# Patient Record
Sex: FEMALE | Race: BLACK OR AFRICAN AMERICAN | NOT HISPANIC OR LATINO | Employment: FULL TIME | URBAN - METROPOLITAN AREA
[De-identification: names, ages, dates, MRNs, and addresses within clinical notes are randomized per-mention and may not be internally consistent; named-entity substitution may affect disease eponyms.]

---

## 2017-08-23 ENCOUNTER — HISTORICAL (OUTPATIENT)
Dept: ADMINISTRATIVE | Facility: HOSPITAL | Age: 22
End: 2017-08-23

## 2017-08-23 LAB
ABS NEUT (OLG): 3.55 X10(3)/MCL (ref 2.1–9.2)
BASOPHILS # BLD AUTO: 0.03 X10(3)/MCL
BASOPHILS NFR BLD AUTO: 0 % (ref 0–1)
BILIRUB SERPL-MCNC: NEGATIVE MG/DL
BLOOD URINE, POC: NEGATIVE
BUN SERPL-MCNC: 7 MG/DL (ref 7–18)
CALCIUM SERPL-MCNC: 9.1 MG/DL (ref 8.5–10.1)
CHLORIDE SERPL-SCNC: 103 MMOL/L (ref 98–107)
CLARITY, POC UA: CLEAR
CO2 SERPL-SCNC: 27 MMOL/L (ref 21–32)
COLOR, POC UA: YELLOW
CREAT SERPL-MCNC: 0.6 MG/DL (ref 0.6–1.3)
EOSINOPHIL # BLD AUTO: 0.08 X10(3)/MCL
EOSINOPHIL NFR BLD AUTO: 1 % (ref 0–5)
ERYTHROCYTE [DISTWIDTH] IN BLOOD BY AUTOMATED COUNT: 15 % (ref 11.5–14.5)
GLUCOSE SERPL-MCNC: 83 MG/DL (ref 74–106)
GLUCOSE UR QL STRIP: NEGATIVE
HBV SURFACE AG SERPL QL IA: NEGATIVE
HCT VFR BLD AUTO: 35 % (ref 35–46)
HCV AB SERPL QL IA: NONREACTIVE
HGB BLD-MCNC: 11.7 GM/DL (ref 12–16)
HIV 1+2 AB+HIV1 P24 AG SERPL QL IA: NONREACTIVE
IMM GRANULOCYTES # BLD AUTO: 0.01 10*3/UL
IMM GRANULOCYTES NFR BLD AUTO: 0 %
KETONES UR QL STRIP: NEGATIVE
LEUKOCYTE EST, POC UA: NEGATIVE
LYMPHOCYTES # BLD AUTO: 1.8 X10(3)/MCL
LYMPHOCYTES NFR BLD AUTO: 31 % (ref 15–40)
MCH RBC QN AUTO: 27.2 PG (ref 26–34)
MCHC RBC AUTO-ENTMCNC: 33.4 GM/DL (ref 31–37)
MCV RBC AUTO: 81.4 FL (ref 80–100)
MONOCYTES # BLD AUTO: 0.42 X10(3)/MCL
MONOCYTES NFR BLD AUTO: 7 % (ref 4–12)
NEUTROPHILS # BLD AUTO: 3.55 X10(3)/MCL
NEUTROPHILS NFR BLD AUTO: 60 X10(3)/MCL
NITRITE, POC UA: NEGATIVE
PH, POC UA: 6.5
PLATELET # BLD AUTO: 360 X10(3)/MCL (ref 130–400)
PMV BLD AUTO: 9.8 FL (ref 7.4–10.4)
POTASSIUM SERPL-SCNC: 3.6 MMOL/L (ref 3.5–5.1)
PROTEIN, POC: NEGATIVE
RBC # BLD AUTO: 4.3 X10(6)/MCL (ref 4–5.2)
SODIUM SERPL-SCNC: 137 MMOL/L (ref 136–145)
SPECIFIC GRAVITY, POC UA: 1.01
T PALLIDUM AB SER QL: NONREACTIVE
UROBILINOGEN, POC UA: NORMAL
WBC # SPEC AUTO: 5.9 X10(3)/MCL (ref 4.5–11)

## 2017-08-25 LAB — FINAL CULTURE: NO GROWTH

## 2017-09-18 LAB
BILIRUB SERPL-MCNC: NEGATIVE MG/DL
BLOOD URINE, POC: NORMAL
CLARITY, POC UA: CLEAR
COLOR, POC UA: YELLOW
GLUCOSE UR QL STRIP: NEGATIVE
KETONES UR QL STRIP: NEGATIVE
LEUKOCYTE EST, POC UA: NEGATIVE
NITRITE, POC UA: NEGATIVE
PH, POC UA: 7.5
PROTEIN, POC: NEGATIVE
SPECIFIC GRAVITY, POC UA: 1
UROBILINOGEN, POC UA: NORMAL

## 2017-10-23 LAB
BILIRUB SERPL-MCNC: NEGATIVE MG/DL
BLOOD URINE, POC: NEGATIVE
CLARITY, POC UA: CLEAR
COLOR, POC UA: YELLOW
GLUCOSE UR QL STRIP: NEGATIVE
KETONES UR QL STRIP: NEGATIVE
LEUKOCYTE EST, POC UA: NEGATIVE
NITRITE, POC UA: NEGATIVE
PH, POC UA: 7
PROTEIN, POC: NEGATIVE
SPECIFIC GRAVITY, POC UA: 1
UROBILINOGEN, POC UA: NORMAL

## 2017-12-01 LAB
BILIRUB SERPL-MCNC: NORMAL MG/DL
BLOOD URINE, POC: NEGATIVE
CLARITY, POC UA: CLEAR
COLOR, POC UA: YELLOW
GLUCOSE UR QL STRIP: NEGATIVE
KETONES UR QL STRIP: NEGATIVE
LEUKOCYTE EST, POC UA: NEGATIVE
NITRITE, POC UA: NEGATIVE
PH, POC UA: 6.5
PROTEIN, POC: NORMAL
SPECIFIC GRAVITY, POC UA: 1.01
UROBILINOGEN, POC UA: NORMAL

## 2017-12-10 ENCOUNTER — HOSPITAL ENCOUNTER (OUTPATIENT)
Dept: OBSTETRICS AND GYNECOLOGY | Facility: HOSPITAL | Age: 22
End: 2017-12-10
Attending: OBSTETRICS & GYNECOLOGY | Admitting: OBSTETRICS & GYNECOLOGY

## 2017-12-10 LAB
APPEARANCE, UA: CLEAR
BACTERIA SPEC CULT: NORMAL /HPF
BILIRUB UR QL STRIP: NEGATIVE
COLOR UR: YELLOW
GLUCOSE (UA): NEGATIVE
HGB UR QL STRIP: NEGATIVE
KETONES UR QL STRIP: NEGATIVE
LEUKOCYTE ESTERASE UR QL STRIP: NEGATIVE
NITRITE UR QL STRIP: NEGATIVE
PH UR STRIP: 7.5 [PH] (ref 5–9)
PROT UR QL STRIP: NEGATIVE
RBC #/AREA URNS HPF: NORMAL /[HPF]
SP GR UR STRIP: 1.02 (ref 1–1.03)
SQUAMOUS EPITHELIAL, UA: NORMAL
UROBILINOGEN UR STRIP-ACNC: 1
WBC #/AREA URNS HPF: NORMAL /[HPF]

## 2018-01-09 ENCOUNTER — HISTORICAL (OUTPATIENT)
Dept: ADMINISTRATIVE | Facility: HOSPITAL | Age: 23
End: 2018-01-09

## 2018-01-09 ENCOUNTER — HISTORICAL (OUTPATIENT)
Dept: FAMILY MEDICINE | Facility: CLINIC | Age: 23
End: 2018-01-09

## 2018-01-09 LAB
BILIRUB SERPL-MCNC: NEGATIVE MG/DL
BLOOD URINE, POC: NEGATIVE
CLARITY, POC UA: CLEAR
COLOR, POC UA: NORMAL
GLUCOSE 1H P 100 G GLC PO SERPL-MCNC: 114 MG/DL
GLUCOSE UR QL STRIP: NEGATIVE
KETONES UR QL STRIP: NEGATIVE
LEUKOCYTE EST, POC UA: NEGATIVE
NITRITE, POC UA: NEGATIVE
PH, POC UA: 6.5
PROTEIN, POC: NORMAL
SPECIFIC GRAVITY, POC UA: 1.02
T PALLIDUM AB SER QL: NONREACTIVE
UROBILINOGEN, POC UA: NORMAL

## 2018-02-06 LAB
BILIRUB SERPL-MCNC: NEGATIVE MG/DL
BLOOD URINE, POC: NEGATIVE
CLARITY, POC UA: NORMAL
COLOR, POC UA: YELLOW
GLUCOSE UR QL STRIP: NEGATIVE
KETONES UR QL STRIP: NEGATIVE
LEUKOCYTE EST, POC UA: NEGATIVE
NITRITE, POC UA: NEGATIVE
PH, POC UA: 7
PROTEIN, POC: NORMAL
SPECIFIC GRAVITY, POC UA: 1
UROBILINOGEN, POC UA: NORMAL

## 2018-02-20 LAB
BILIRUB SERPL-MCNC: NEGATIVE MG/DL
BLOOD URINE, POC: NEGATIVE
CLARITY, POC UA: CLEAR
COLOR, POC UA: YELLOW
GLUCOSE UR QL STRIP: NEGATIVE
KETONES UR QL STRIP: NEGATIVE
LEUKOCYTE EST, POC UA: NEGATIVE
NITRITE, POC UA: NEGATIVE
PH, POC UA: 7
PROTEIN, POC: NEGATIVE
SPECIFIC GRAVITY, POC UA: 1.01
UROBILINOGEN, POC UA: NORMAL

## 2018-03-08 ENCOUNTER — HISTORICAL (OUTPATIENT)
Dept: ADMINISTRATIVE | Facility: HOSPITAL | Age: 23
End: 2018-03-08

## 2018-03-08 LAB
ABS NEUT (OLG): 6.99 X10(3)/MCL (ref 2.1–9.2)
BASOPHILS # BLD AUTO: 0.03 X10(3)/MCL
BASOPHILS NFR BLD AUTO: 0 %
BILIRUB SERPL-MCNC: NEGATIVE MG/DL
BLOOD URINE, POC: NEGATIVE
CLARITY, POC UA: CLEAR
COLOR, POC UA: YELLOW
EOSINOPHIL # BLD AUTO: 0.19 X10(3)/MCL
EOSINOPHIL NFR BLD AUTO: 2 %
ERYTHROCYTE [DISTWIDTH] IN BLOOD BY AUTOMATED COUNT: 14.9 % (ref 11.5–14.5)
GLUCOSE UR QL STRIP: NEGATIVE
HCT VFR BLD AUTO: 28.7 % (ref 35–46)
HGB BLD-MCNC: 9.3 GM/DL (ref 12–16)
HIV 1+2 AB+HIV1 P24 AG SERPL QL IA: NONREACTIVE
IMM GRANULOCYTES # BLD AUTO: 0.12 10*3/UL
IMM GRANULOCYTES NFR BLD AUTO: 1 %
KETONES UR QL STRIP: NEGATIVE
LEUKOCYTE EST, POC UA: NORMAL
LYMPHOCYTES # BLD AUTO: 2.06 X10(3)/MCL
LYMPHOCYTES NFR BLD AUTO: 20 % (ref 13–40)
MCH RBC QN AUTO: 27 PG (ref 26–34)
MCHC RBC AUTO-ENTMCNC: 32.4 GM/DL (ref 31–37)
MCV RBC AUTO: 83.4 FL (ref 80–100)
MONOCYTES # BLD AUTO: 0.88 X10(3)/MCL
MONOCYTES NFR BLD AUTO: 9 % (ref 4–12)
NEUTROPHILS # BLD AUTO: 6.99 X10(3)/MCL
NEUTROPHILS NFR BLD AUTO: 68 X10(3)/MCL
NITRITE, POC UA: NEGATIVE
PH, POC UA: 7
PLATELET # BLD AUTO: 277 X10(3)/MCL (ref 130–400)
PMV BLD AUTO: 9.5 FL (ref 7.4–10.4)
PROTEIN, POC: NORMAL
RBC # BLD AUTO: 3.44 X10(6)/MCL (ref 4–5.2)
SPECIFIC GRAVITY, POC UA: 1.01
UROBILINOGEN, POC UA: NORMAL
WBC # SPEC AUTO: 10.3 X10(3)/MCL (ref 4.5–11)

## 2018-03-11 LAB — FINAL CULTURE: NORMAL

## 2018-03-14 LAB
BILIRUB SERPL-MCNC: NEGATIVE MG/DL
BLOOD URINE, POC: NEGATIVE
CLARITY, POC UA: CLEAR
COLOR, POC UA: YELLOW
GLUCOSE UR QL STRIP: NEGATIVE
KETONES UR QL STRIP: NEGATIVE
LEUKOCYTE EST, POC UA: NORMAL
NITRITE, POC UA: NEGATIVE
PH, POC UA: 7.5
PROTEIN, POC: NORMAL
SPECIFIC GRAVITY, POC UA: 1.01
UROBILINOGEN, POC UA: NORMAL

## 2018-03-20 LAB
BILIRUB SERPL-MCNC: NEGATIVE MG/DL
BLOOD URINE, POC: NEGATIVE
CLARITY, POC UA: CLEAR
COLOR, POC UA: YELLOW
GLUCOSE UR QL STRIP: NEGATIVE
KETONES UR QL STRIP: NEGATIVE
LEUKOCYTE EST, POC UA: NEGATIVE
NITRITE, POC UA: NEGATIVE
PH, POC UA: 7.5
PROTEIN, POC: NORMAL
SPECIFIC GRAVITY, POC UA: 1.01
UROBILINOGEN, POC UA: NORMAL

## 2018-03-26 LAB
BILIRUB SERPL-MCNC: NEGATIVE MG/DL
BLOOD URINE, POC: NEGATIVE
CLARITY, POC UA: NORMAL
COLOR, POC UA: YELLOW
GLUCOSE UR QL STRIP: NEGATIVE
KETONES UR QL STRIP: NEGATIVE
LEUKOCYTE EST, POC UA: NEGATIVE
NITRITE, POC UA: NEGATIVE
PH, POC UA: 6.5
PROTEIN, POC: NORMAL
SPECIFIC GRAVITY, POC UA: 1.01
UROBILINOGEN, POC UA: NORMAL

## 2018-07-30 ENCOUNTER — HISTORICAL (OUTPATIENT)
Dept: RADIOLOGY | Facility: HOSPITAL | Age: 23
End: 2018-07-30

## 2018-07-30 LAB
BUN SERPL-MCNC: 8 MG/DL (ref 7–18)
CREAT SERPL-MCNC: 0.6 MG/DL (ref 0.6–1.3)

## 2020-08-04 LAB — POC BETA-HCG (QUAL): NEGATIVE

## 2020-10-27 ENCOUNTER — HISTORICAL (OUTPATIENT)
Dept: ADMINISTRATIVE | Facility: HOSPITAL | Age: 25
End: 2020-10-27

## 2020-10-27 LAB
ABS NEUT (OLG): 2.93 X10(3)/MCL (ref 2.1–9.2)
BASOPHILS # BLD AUTO: 0 X10(3)/MCL (ref 0–0.2)
BASOPHILS NFR BLD AUTO: 0 %
BUN SERPL-MCNC: 10 MG/DL (ref 7–18.7)
CALCIUM SERPL-MCNC: 9.5 MG/DL (ref 8.4–10.2)
CHLORIDE SERPL-SCNC: 102 MMOL/L (ref 98–107)
CO2 SERPL-SCNC: 28 MMOL/L (ref 22–29)
CREAT SERPL-MCNC: 0.72 MG/DL (ref 0.55–1.02)
CREAT/UREA NIT SERPL: 14
EOSINOPHIL # BLD AUTO: 0.1 X10(3)/MCL (ref 0–0.9)
EOSINOPHIL NFR BLD AUTO: 2 %
ERYTHROCYTE [DISTWIDTH] IN BLOOD BY AUTOMATED COUNT: 12.8 % (ref 11.5–14.5)
GLUCOSE SERPL-MCNC: 53 MG/DL (ref 74–100)
HCT VFR BLD AUTO: 43.2 % (ref 35–46)
HGB BLD-MCNC: 14.1 GM/DL (ref 12–16)
IMM GRANULOCYTES # BLD AUTO: 0.01 10*3/UL
IMM GRANULOCYTES NFR BLD AUTO: 0 %
LYMPHOCYTES # BLD AUTO: 2.2 X10(3)/MCL (ref 0.6–4.6)
LYMPHOCYTES NFR BLD AUTO: 38 %
MCH RBC QN AUTO: 30.1 PG (ref 26–34)
MCHC RBC AUTO-ENTMCNC: 32.6 GM/DL (ref 31–37)
MCV RBC AUTO: 92.1 FL (ref 80–100)
MONOCYTES # BLD AUTO: 0.4 X10(3)/MCL (ref 0.1–1.3)
MONOCYTES NFR BLD AUTO: 8 %
NEUTROPHILS # BLD AUTO: 2.93 X10(3)/MCL (ref 2.1–9.2)
NEUTROPHILS NFR BLD AUTO: 52 %
PLATELET # BLD AUTO: 308 X10(3)/MCL (ref 130–400)
PMV BLD AUTO: 9.6 FL (ref 7.4–10.4)
POTASSIUM SERPL-SCNC: 3.5 MMOL/L (ref 3.5–5.1)
RBC # BLD AUTO: 4.69 X10(6)/MCL (ref 4–5.2)
SODIUM SERPL-SCNC: 136 MMOL/L (ref 136–145)
WBC # SPEC AUTO: 5.7 X10(3)/MCL (ref 4.5–11)

## 2021-04-30 ENCOUNTER — HISTORICAL (OUTPATIENT)
Dept: ADMINISTRATIVE | Facility: HOSPITAL | Age: 26
End: 2021-04-30

## 2021-04-30 LAB
CHOLEST SERPL-MCNC: 155 MG/DL
CHOLEST/HDLC SERPL: 3 {RATIO} (ref 0–5)
HDLC SERPL-MCNC: 55 MG/DL (ref 35–60)
LDLC SERPL CALC-MCNC: 85 MG/DL (ref 50–140)
TRIGL SERPL-MCNC: 77 MG/DL (ref 37–140)
VLDLC SERPL CALC-MCNC: 15 MG/DL

## 2021-09-21 LAB
PAP RECOMMENDATION EXT: NORMAL
PAP SMEAR: NORMAL

## 2022-04-10 ENCOUNTER — HISTORICAL (OUTPATIENT)
Dept: ADMINISTRATIVE | Facility: HOSPITAL | Age: 27
End: 2022-04-10
Payer: MEDICAID

## 2022-04-26 VITALS
BODY MASS INDEX: 29.55 KG/M2 | WEIGHT: 183.88 LBS | DIASTOLIC BLOOD PRESSURE: 87 MMHG | HEIGHT: 66 IN | OXYGEN SATURATION: 98 % | SYSTOLIC BLOOD PRESSURE: 125 MMHG

## 2022-04-29 NOTE — PROGRESS NOTES
Health Maintenance     Pending (in the next year)        OverDue           Cervical Cancer Screening due  04/19/17  and every 1  year(s)        Due            Alcohol Misuse Screening due  08/23/17  and every 1  year(s)        Due In Future            Influenza Vaccine not due until  10/02/17  and every 1  year(s)           Depression Screening not due until  07/21/18  and every 1  year(s)     Satisfied (in the past 1 year)        Satisfied            Blood Pressure Screening on  08/23/17.  Satisfied by Sindy Richardson LPN           Body Mass Index Check on  08/23/17.  Satisfied by Sindy Richardson LPN           Depression Screening on  07/21/17.  Satisfied by Zulma Ruth LPN           HIV Screening on  08/30/17.  Satisfied by Mariama Elias MD           Influenza Vaccine on  12/09/16.  Satisfied by Jeanie Perry LPN           Obesity Screening on  08/23/17.  Satisfied by Sindy iRchardson LPN

## 2022-04-29 NOTE — PROGRESS NOTES
Patient:   Heide Rodrigues            MRN: [id number]            FIN: [id number]               Age:   22 years     Sex:  Female     :  1995   Associated Diagnoses:   None   Author:   Mariama Elias MD      Chief Complaint   2017 7:51 CDT       initial ob, no complaints.      History of Present Illness   23 yo  8w 2d /7 WGA with EDC of 18 by 1st trimester ultrasound presents to [facility] Initial OB clinic.    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,                           Asthma uses albuterol,                             GERD takes Nexium      OB Review of Systems:  Fetal movements: denies  Vaginal bleeding: some spotting a week ago  Vaginal discharge: denies  Loss of fluid: denies  Contractions: denies  Headaches: intermittent  Vision changes: denies  Edema: denies    Gestational History:   - G2: current pregnancy      - G1: , c section due to failure to progress, 41 weeks, 7pounds, female, no complications   GYN History: LMP: 17, Age at menarche: 14yo, Menstrual hx: periods regular 28 day interval, previously on (birth control), denies h/o STDs, denies h/o abnormal pap smears  Past Medical History:Denied  Surgical History: C section, tonsel  Social History: Denies tobacco, alcohol, previous marijuana use  Medications: PNV, albuterol, (Elavil, Gabapentin, Sertraline)  Family History: Denied           Physical Examination   Vital Signs   2017 7:51 CDT       Temperature Oral          36.9 DegC                             Peripheral Pulse Rate     73 bpm                             Respiratory Rate          20 br/min                             SpO2                      100 %                             Saturation Probe Site     Hand, left                             Systolic Blood Pressure   124 mmHg                             Diastolic Blood Pressure  83 mmHg                             Mean Arterial Pressure,  Cuff              97 mmHg                             Blood Pressure Location   Right arm                             Blood Pressure Cuff Size  Adult large     General:  No acute distress.    Respiratory:  Lungs are clear to auscultation, Respirations are non-labored, Breath sounds are equal, Symmetrical chest wall expansion.    Cardiovascular:  Normal rate, Regular rhythm, Good pulses equal in all extremities, No edema, No murmurs appreciated.    Gastrointestinal:  Soft, Non-tender, Normal bowel sounds, gravid.    Obstetric Exam     Uterus: consistent with gestational age.     Perineum: intact, no lesions.     Vulva: no lesions.     Vagina: no discharge, no lesions, no mass.     Cervix (closed, no masses or lesions noted on exam).     Integumentary:  Warm, Dry.    Neurologic:  Alert, Oriented, no focal motor or neurological deficits appreciated.    Cognition and Speech:  Oriented, Speech clear and coherent, Functional cognition intact.    Psychiatric:  Cooperative, Appropriate mood & affect, Normal judgment.       Review / Management     Ultrasound: 17 at 8w4d   Comments: Early 1st trimester intrauterine pregnancy measuring 8w4d for EDC of 2018 consistent with menstrual date of 2018. FHR: 167. Viable intrauterine pregnancy.  ASSIGN EDC: 2018    Inital OB Labs:  Blood Type and Rh: _  Antibody Screen: _  CBC H/H: _  HIV: _  RPR: _  GC: _  CT: _  HBsAg: _  HCVAb: _  Rubella: _  Varicella: _  UA & Culture: _  Sickle Cell Screen: _  PAP: _  Influenza vaccine date: _    15-20 Weeks Lab  Quad Screen: _    28 Week Lab  1H GTT: _  Date of Rhogam if indicated: _  Date of Tdap: _  CBC H/H: _  RPR: _    36 Week Lab  CBC H/H: _  RPR: _  GBS Culture: _  HIV: _  Urine: GC: _  Urine: CT: _       Impression and Plan   Diagnosis     AP: 23 yo  8w 2d /7 WGA  1. IUP   - Start PNVs, prescription sent to pharmacy   - Initial OB labs ordered   - Plans on both bottle and breast feeding   - Desires patch for  postpartum contraception   - Labor precautions given    2. Headaches      - Well controlled      - Does not need any medication for relief    3 GERD      - Takes Nexium      - Encouraged patient to switch to Pepcid complete     4. Asthma       - Well controlled       - Does not need albuterol inhaler    4. Lipoma on Skull plate      - Stable      - Followed by Neurology      - Annual MRIs    Return to clinic 4 weeks, assign to FM resident

## 2022-04-29 NOTE — PROGRESS NOTES
Patient:   Heide Rodrigues            MRN: [id number]            FIN: [id number]               Age:   22 years     Sex:  Female     :  1995   Associated Diagnoses:   None   Author:   Buzz Curtis MD      Physician: BEATRIZ  Reason for Exam: INITIAL PRENATAL VISIT; DATING ULTRASOUND  : 2  Parity: 1  LMP: 2017  Gestational Age: 8w2d  EDC: 2018    Examination:  EVERTON: ADEQUATE  Fetal Tone: PRESENT  Fetal Movement:   Fetal Heart Rate: 167  Fetus: SINLGE  Presentation:   Placenta:       Position: ANTERIOR      Grade:     Measurement:  CRL: 2.03cm  EGA: 8w4d  EDC: 2018    Comments: Early 1st trimester intrauterine pregnancy measuring 8w4d for EDC of 2018 consistent with menstrual date of 2018. FHR: 167. Viable intrauterine pregnancy.  ASSIGN EDC: 2018          This document has images

## 2022-05-17 ENCOUNTER — CLINICAL SUPPORT (OUTPATIENT)
Dept: GYNECOLOGY | Facility: CLINIC | Age: 27
End: 2022-05-17
Payer: MEDICAID

## 2022-05-17 DIAGNOSIS — Z30.9 ENCOUNTER FOR CONTRACEPTIVE MANAGEMENT, UNSPECIFIED TYPE: Primary | ICD-10-CM

## 2022-05-17 PROCEDURE — 99212 OFFICE O/P EST SF 10 MIN: CPT | Mod: PBBFAC

## 2022-05-17 PROCEDURE — 96372 THER/PROPH/DIAG INJ SC/IM: CPT | Mod: PBBFAC

## 2022-05-17 RX ORDER — MEDROXYPROGESTERONE ACETATE 150 MG/ML
150 INJECTION, SUSPENSION INTRAMUSCULAR
Status: COMPLETED | OUTPATIENT
Start: 2022-05-17 | End: 2022-05-17

## 2022-05-17 RX ADMIN — MEDROXYPROGESTERONE ACETATE 150 MG: 150 INJECTION, SUSPENSION INTRAMUSCULAR at 12:05

## 2022-06-16 ENCOUNTER — OFFICE VISIT (OUTPATIENT)
Dept: FAMILY MEDICINE | Facility: CLINIC | Age: 27
End: 2022-06-16
Payer: MEDICAID

## 2022-06-16 VITALS
DIASTOLIC BLOOD PRESSURE: 79 MMHG | BODY MASS INDEX: 31.72 KG/M2 | WEIGHT: 197.38 LBS | TEMPERATURE: 98 F | RESPIRATION RATE: 18 BRPM | SYSTOLIC BLOOD PRESSURE: 125 MMHG | HEART RATE: 102 BPM

## 2022-06-16 DIAGNOSIS — Z30.9 ENCOUNTER FOR CONTRACEPTIVE MANAGEMENT, UNSPECIFIED TYPE: Primary | ICD-10-CM

## 2022-06-16 DIAGNOSIS — R10.9 ABDOMINAL CRAMPING: ICD-10-CM

## 2022-06-16 DIAGNOSIS — J45.909 ASTHMA, UNSPECIFIED ASTHMA SEVERITY, UNSPECIFIED WHETHER COMPLICATED, UNSPECIFIED WHETHER PERSISTENT: ICD-10-CM

## 2022-06-16 PROCEDURE — 99213 OFFICE O/P EST LOW 20 MIN: CPT | Mod: PBBFAC

## 2022-06-16 RX ORDER — ALBUTEROL SULFATE 90 UG/1
2 AEROSOL, METERED RESPIRATORY (INHALATION) EVERY 6 HOURS PRN
Qty: 18 G | Refills: 3 | Status: SHIPPED | OUTPATIENT
Start: 2022-06-16 | End: 2023-03-23 | Stop reason: SDUPTHER

## 2022-06-16 NOTE — PROGRESS NOTES
Subjective:       Patient ID: Heide Rodrigues is a 27 y.o. female.    Chief Complaint: Follow-up (C/o bad cramps w/o period. )    HPI     Lower abdominal cramping, alternates sides, states irritating at times but not so bothersome as limits her activity. States maybe 1-2 times a month. Has not had period since starting on Depo, UPT negative prior to last depo injection; not associated with anything, denies constipation, denies vaginal discharge    Asthma: doing well, rarely needs inhaler, requesting refill  Contraception: depo provera every 3 months, last injection 1 month ago    Review of Systems   Constitutional: Negative for activity change and unexpected weight change.   HENT: Negative for hearing loss, rhinorrhea and trouble swallowing.    Eyes: Negative for discharge and visual disturbance.   Respiratory: Negative for chest tightness and wheezing.    Cardiovascular: Negative for chest pain and palpitations.   Gastrointestinal: Negative for blood in stool, constipation, diarrhea and vomiting.   Endocrine: Negative for polydipsia and polyuria.   Genitourinary: Negative for difficulty urinating, dysuria, hematuria and menstrual problem.   Musculoskeletal: Negative for arthralgias, joint swelling and neck pain.   Neurological: Negative for weakness and headaches.   Psychiatric/Behavioral: Negative for confusion and dysphoric mood.         Objective:      Blood pressure 125/79, pulse 102, temperature 98.4 °F (36.9 °C), temperature source Oral, resp. rate 18, weight 89.5 kg (197 lb 6.4 oz).   Physical Exam    General: appears well, in no acute distress  Respiratory: clear to auscultation bilaterally, nonlabored respirations  Cardiovascular: regular rate and rhythm without murmurs, no pretibial edema  Gastrointestinal: soft, non-tender, non-distended, bowel sounds present; no masses, no guarding, no rebound    Assessment/Plan:  Problem List Items Addressed This Visit        Pulmonary    Asthma      Other Visit  Diagnoses     Encounter for contraceptive management, unspecified type    -  Primary    Abdominal cramping             Continue Depo-Provera every 3 months  Advised to keep journal on abdominal cramping on associated time of month, eating habits, etc  May need Pelvic US in future  Return precautions given for worsening pain, or more frequent occurrences.   Discussed ovulation cramping vs fibroids as possible causes, but cannot rule out other causes at this time  Albuterol inhaler PRN for asthma     RTC 6mo    Joan Kang DO  [employer] FM Resident HO-3

## 2022-06-18 NOTE — PROGRESS NOTES
I reviewed History, PE, A/P and chart was reviewed.   Services provided in a teaching facility, I was immediately available   I agree with resident, care reasonable and appropriate.        Has 8/2022 GYN [facility] appt   Consider pelvic exam, UA/UC if cont   Pap 9/2021 w/o co  NIL

## 2022-08-18 ENCOUNTER — CLINICAL SUPPORT (OUTPATIENT)
Dept: GYNECOLOGY | Facility: CLINIC | Age: 27
End: 2022-08-18
Payer: MEDICAID

## 2022-08-18 DIAGNOSIS — Z30.9 ENCOUNTER FOR CONTRACEPTIVE MANAGEMENT, UNSPECIFIED TYPE: Primary | ICD-10-CM

## 2022-08-18 DIAGNOSIS — Z23 NEED FOR HPV VACCINATION: ICD-10-CM

## 2022-08-18 PROCEDURE — 96372 THER/PROPH/DIAG INJ SC/IM: CPT | Mod: PBBFAC

## 2022-08-18 PROCEDURE — 99211 OFF/OP EST MAY X REQ PHY/QHP: CPT | Mod: PBBFAC

## 2022-08-18 PROCEDURE — 90471 IMMUNIZATION ADMIN: CPT | Mod: PBBFAC

## 2022-08-18 PROCEDURE — 90651 9VHPV VACCINE 2/3 DOSE IM: CPT | Mod: PBBFAC

## 2022-08-18 RX ORDER — MEDROXYPROGESTERONE ACETATE 150 MG/ML
150 INJECTION, SUSPENSION INTRAMUSCULAR
Status: COMPLETED | OUTPATIENT
Start: 2022-08-18 | End: 2022-08-18

## 2022-08-18 RX ADMIN — HUMAN PAPILLOMAVIRUS 9-VALENT VACCINE, RECOMBINANT 0.5 ML: 30; 40; 60; 40; 20; 20; 20; 20; 20 INJECTION, SUSPENSION INTRAMUSCULAR at 09:08

## 2022-08-18 RX ADMIN — MEDROXYPROGESTERONE ACETATE 150 MG: 150 INJECTION, SUSPENSION, EXTENDED RELEASE INTRAMUSCULAR at 09:08

## 2022-08-18 NOTE — PROGRESS NOTES
Depo provera given in left hip, tolerated well.  Gardasil #2 given in left deltoid, tolerated well.  Scheduled next depo and Gardasil #3 at same date , time.  Patient had Gardasil #1 9/2021 and her grandmother told her she didn't have to take that because she was too old.  Explained to patient that the age was lifted to 45  Several years ago and she agreed to complete the series

## 2022-09-21 ENCOUNTER — HISTORICAL (OUTPATIENT)
Dept: ADMINISTRATIVE | Facility: HOSPITAL | Age: 27
End: 2022-09-21
Payer: MEDICAID

## 2022-11-22 ENCOUNTER — CLINICAL SUPPORT (OUTPATIENT)
Dept: GYNECOLOGY | Facility: CLINIC | Age: 27
End: 2022-11-22
Payer: MEDICAID

## 2022-11-22 DIAGNOSIS — Z23 NEED FOR HPV VACCINATION: ICD-10-CM

## 2022-11-22 DIAGNOSIS — Z30.9 ENCOUNTER FOR CONTRACEPTIVE MANAGEMENT, UNSPECIFIED TYPE: Primary | ICD-10-CM

## 2022-11-22 PROCEDURE — 90651 9VHPV VACCINE 2/3 DOSE IM: CPT | Mod: PBBFAC

## 2022-11-22 PROCEDURE — 99211 OFF/OP EST MAY X REQ PHY/QHP: CPT | Mod: PBBFAC

## 2022-11-22 PROCEDURE — 96372 THER/PROPH/DIAG INJ SC/IM: CPT | Mod: PBBFAC

## 2022-11-22 PROCEDURE — 90471 IMMUNIZATION ADMIN: CPT | Mod: PBBFAC

## 2022-11-22 RX ORDER — MEDROXYPROGESTERONE ACETATE 150 MG/ML
150 INJECTION, SUSPENSION INTRAMUSCULAR
Status: COMPLETED | OUTPATIENT
Start: 2022-11-22 | End: 2022-11-22

## 2022-11-22 RX ADMIN — HUMAN PAPILLOMAVIRUS 9-VALENT VACCINE, RECOMBINANT 0.5 ML: 30; 40; 60; 40; 20; 20; 20; 20; 20 INJECTION, SUSPENSION INTRAMUSCULAR at 10:11

## 2022-11-22 RX ADMIN — MEDROXYPROGESTERONE ACETATE 150 MG: 150 INJECTION, SUSPENSION, EXTENDED RELEASE INTRAMUSCULAR at 10:11

## 2023-03-23 ENCOUNTER — OFFICE VISIT (OUTPATIENT)
Dept: FAMILY MEDICINE | Facility: CLINIC | Age: 28
End: 2023-03-23
Payer: COMMERCIAL

## 2023-03-23 VITALS
SYSTOLIC BLOOD PRESSURE: 126 MMHG | HEART RATE: 78 BPM | WEIGHT: 200 LBS | OXYGEN SATURATION: 100 % | BODY MASS INDEX: 32.14 KG/M2 | TEMPERATURE: 98 F | DIASTOLIC BLOOD PRESSURE: 86 MMHG | RESPIRATION RATE: 18 BRPM

## 2023-03-23 DIAGNOSIS — J45.909 ASTHMA DUE TO SEASONAL ALLERGIES: ICD-10-CM

## 2023-03-23 DIAGNOSIS — R53.83 FATIGUE, UNSPECIFIED TYPE: ICD-10-CM

## 2023-03-23 DIAGNOSIS — Z00.00 ENCOUNTER FOR WELLNESS EXAMINATION IN ADULT: Primary | ICD-10-CM

## 2023-03-23 DIAGNOSIS — Z88.9 H/O SEASONAL ALLERGIES: ICD-10-CM

## 2023-03-23 LAB
BASOPHILS # BLD AUTO: 0.02 X10(3)/MCL (ref 0–0.2)
BASOPHILS NFR BLD AUTO: 0.4 %
EOSINOPHIL # BLD AUTO: 0.12 X10(3)/MCL (ref 0–0.9)
EOSINOPHIL NFR BLD AUTO: 2.4 %
ERYTHROCYTE [DISTWIDTH] IN BLOOD BY AUTOMATED COUNT: 12.5 % (ref 11.5–17)
HCT VFR BLD AUTO: 40.8 % (ref 37–47)
HGB BLD-MCNC: 13.7 G/DL (ref 12–16)
IMM GRANULOCYTES # BLD AUTO: 0.02 X10(3)/MCL (ref 0–0.04)
IMM GRANULOCYTES NFR BLD AUTO: 0.4 %
LYMPHOCYTES # BLD AUTO: 1.6 X10(3)/MCL (ref 0.6–4.6)
LYMPHOCYTES NFR BLD AUTO: 31.7 %
MCH RBC QN AUTO: 29.9 PG
MCHC RBC AUTO-ENTMCNC: 33.6 G/DL (ref 33–36)
MCV RBC AUTO: 89.1 FL (ref 80–94)
MONOCYTES # BLD AUTO: 0.53 X10(3)/MCL (ref 0.1–1.3)
MONOCYTES NFR BLD AUTO: 10.5 %
NEUTROPHILS # BLD AUTO: 2.76 X10(3)/MCL (ref 2.1–9.2)
NEUTROPHILS NFR BLD AUTO: 54.6 %
NRBC BLD AUTO-RTO: 0 %
PLATELET # BLD AUTO: 377 X10(3)/MCL (ref 130–400)
PMV BLD AUTO: 9.2 FL (ref 7.4–10.4)
RBC # BLD AUTO: 4.58 X10(6)/MCL (ref 4.2–5.4)
T4 FREE SERPL-MCNC: 0.7 NG/DL (ref 0.7–1.48)
TSH SERPL-ACNC: 0.66 UIU/ML (ref 0.35–4.94)
WBC # SPEC AUTO: 5.1 X10(3)/MCL (ref 4.5–11.5)

## 2023-03-23 PROCEDURE — 84443 ASSAY THYROID STIM HORMONE: CPT

## 2023-03-23 PROCEDURE — 99214 OFFICE O/P EST MOD 30 MIN: CPT | Mod: PBBFAC

## 2023-03-23 PROCEDURE — 36415 COLL VENOUS BLD VENIPUNCTURE: CPT

## 2023-03-23 PROCEDURE — 84439 ASSAY OF FREE THYROXINE: CPT

## 2023-03-23 PROCEDURE — 85025 COMPLETE CBC W/AUTO DIFF WBC: CPT

## 2023-03-23 RX ORDER — NITROFURANTOIN 25; 75 MG/1; MG/1
100 CAPSULE ORAL 2 TIMES DAILY
COMMUNITY
Start: 2023-03-16

## 2023-03-23 RX ORDER — FLUCONAZOLE 150 MG/1
150 TABLET ORAL ONCE
COMMUNITY
Start: 2023-03-09

## 2023-03-23 RX ORDER — ALBUTEROL SULFATE 90 UG/1
2 AEROSOL, METERED RESPIRATORY (INHALATION) EVERY 6 HOURS PRN
Qty: 18 G | Refills: 3 | Status: SHIPPED | OUTPATIENT
Start: 2023-03-23 | End: 2024-03-22

## 2023-03-23 RX ORDER — CETIRIZINE HYDROCHLORIDE 5 MG/1
5 TABLET ORAL DAILY
Qty: 90 TABLET | Refills: 1 | Status: SHIPPED | OUTPATIENT
Start: 2023-03-23 | End: 2023-09-19

## 2023-03-23 NOTE — PROGRESS NOTES
Family Medicine Clinic Note     Subjective     Patient ID: Heide Rodrigues is a 27 y.o. female.    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     Acute complaints: Pt has been feeling well over all; episodic events of fatigue. Episodes do not last all day, and do not occur every day. 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 Has 2 children 9 and 6 yo. Denies any associated bleeding, lightheadedness or SOB with activity. Recent diagnosis of UTI at local urgent care; on last day of antibiotic. Tolerating medication well and admits resolution of symptoms.     Chronic diagnosis:   Asthma: Well controlled. Has not used inhaler within past few months. Admits to seasonal allergies that respond well to Zyrtec.     Review of Systems   Constitutional:  Positive for malaise/fatigue. Negative for chills and fever.   Respiratory:  Negative for shortness of breath and wheezing.    Cardiovascular:  Negative for chest pain.   Neurological:  Negative for dizziness and weakness.      Objective     Vitals:    03/23/23 0913   BP: 126/86   BP Location: Left arm   Patient Position: Sitting   BP Method: Large (Automatic)   Pulse: 78   Resp: 18   Temp: 98.3 °F (36.8 °C)   TempSrc: Oral   SpO2: 100%   Weight: 90.7 kg (200 lb)      Physical Exam  Vitals and nursing note reviewed.   Constitutional:       General: She is not in acute distress.  Eyes:      Pupils: Pupils are equal, round, and reactive to light.      Comments: Conjunctiva pink   Cardiovascular:      Rate and Rhythm: Normal rate and regular rhythm.      Heart sounds: Normal heart sounds. No murmur heard.  Pulmonary:      Effort: Pulmonary effort is normal. No respiratory distress.      Breath sounds: Normal breath sounds. No wheezing.   Chest:      Chest wall: No tenderness.   Abdominal:      General: Bowel sounds are normal.       Palpations: Abdomen is soft.      Tenderness: There is no abdominal tenderness. There is no guarding.   Neurological:      Mental Status: She is alert and oriented to person, place, and time.     Assessment and Plan    Encounter for wellness examination in adult  Feeling well over all   No acute complaints   Lab work ordered today   Stopped Depo Provera injections (last injection 11/22/22) denied desire for other birth control options at this time. Wants to give body a rest right now from hormones. Counseled about risk of pregnancy. Pt expressed understanding      Fatigue, unspecified type  Ordered lab work to r/o anemia and thyroid causes   Consider iron panel if H/H low   Will continue to monitor at future apts   -     CBC Auto Differential; Future; Expected date: 03/23/2023  -     TSH; Future; Expected date: 03/23/2023  -     T4, Free; Future; Expected date: 03/23/2023    H/O seasonal allergies  Well controlled  Medication refills sent today   -     cetirizine (ZYRTEC) 5 MG tablet; Take 1 tablet (5 mg total) by mouth once daily.  Dispense: 90 tablet; Refill: 1  -     albuterol (PROVENTIL HFA) 90 mcg/actuation inhaler; Inhale 2 puffs into the lungs every 6 (six) hours as needed for Wheezing. Rescue  Dispense: 18 g; Refill: 3       Follow up in about 1 year (around 3/23/2024) for annual visit; call to jacob red sooner if fatigue does not improve.    Health Maintenance    Blood Work - Ordered today 3/23/23  Pap Smear - 9/21/21 - NIL; High risk HPV not tested      Willy Reddy MD  [facility] -I

## 2023-03-24 NOTE — PROGRESS NOTES
Discussed with resident day of encounter 03-23-23.   Pt. seen.  Episodic fatigue; short-lived.  12 hr / day work (weekends).    PE  Gen: NAD.  HEENT: no thyromegaly.  Chest: lungs clear.  CV: reg. rhythm, no murmurs.    Resident's note reviewed 03-24-23.  Agree with assessment; plan of care appropriate.  Follow-up lab work.  Professional services provided in an outpatient primary care center affiliated with a teaching institution.

## 2023-03-29 ENCOUNTER — DOCUMENTATION ONLY (OUTPATIENT)
Dept: ADMINISTRATIVE | Facility: HOSPITAL | Age: 28
End: 2023-03-29
Payer: COMMERCIAL

## 2023-05-03 ENCOUNTER — HOSPITAL ENCOUNTER (OUTPATIENT)
Dept: RADIOLOGY | Facility: HOSPITAL | Age: 28
Discharge: HOME | End: 2023-05-03
Attending: STUDENT IN AN ORGANIZED HEALTH CARE EDUCATION/TRAINING PROGRAM
Payer: COMMERCIAL

## 2023-05-03 ENCOUNTER — OFFICE VISIT (OUTPATIENT)
Dept: FAMILY MEDICINE | Facility: CLINIC | Age: 28
End: 2023-05-03
Payer: COMMERCIAL

## 2023-05-03 VITALS
SYSTOLIC BLOOD PRESSURE: 131 MMHG | RESPIRATION RATE: 20 BRPM | BODY MASS INDEX: 32.14 KG/M2 | TEMPERATURE: 98 F | DIASTOLIC BLOOD PRESSURE: 83 MMHG | HEART RATE: 68 BPM | WEIGHT: 200 LBS | OXYGEN SATURATION: 100 % | HEIGHT: 66 IN

## 2023-05-03 DIAGNOSIS — S29.011A STRAIN OF RIGHT PECTORALIS MUSCLE, INITIAL ENCOUNTER: ICD-10-CM

## 2023-05-03 DIAGNOSIS — S99.922A FOOT TRAUMA, LEFT, INITIAL ENCOUNTER: ICD-10-CM

## 2023-05-03 DIAGNOSIS — S99.922A FOOT TRAUMA, LEFT, INITIAL ENCOUNTER: Primary | ICD-10-CM

## 2023-05-03 PROCEDURE — 73630 X-RAY EXAM OF FOOT: CPT | Mod: TC,LT

## 2023-05-03 PROCEDURE — 90715 TDAP VACCINE 7 YRS/> IM: CPT | Mod: PBBFAC

## 2023-05-03 PROCEDURE — 99215 OFFICE O/P EST HI 40 MIN: CPT | Mod: PBBFAC

## 2023-05-03 PROCEDURE — 90471 IMMUNIZATION ADMIN: CPT | Mod: PBBFAC

## 2023-05-03 RX ORDER — METHOCARBAMOL 750 MG/1
750 TABLET, FILM COATED ORAL
Qty: 40 TABLET | Refills: 0 | Status: SHIPPED | OUTPATIENT
Start: 2023-05-03 | End: 2023-05-13

## 2023-05-03 RX ORDER — DICLOFENAC SODIUM 10 MG/G
2 GEL TOPICAL
Qty: 100 G | Refills: 0 | Status: SHIPPED | OUTPATIENT
Start: 2023-05-03

## 2023-05-03 RX ADMIN — TETANUS TOXOID, REDUCED DIPHTHERIA TOXOID AND ACELLULAR PERTUSSIS VACCINE, ADSORBED 0.5 ML: 5; 2.5; 8; 8; 2.5 SUSPENSION INTRAMUSCULAR at 10:05

## 2023-05-03 NOTE — PROGRESS NOTES
Chief Complaint  Nail Problem (Stepped on nail 05/02/2023 ( in garden)/(LT) foot) and Chest Pain (Since  04/29/2023)      History of Present Illness  Heide Rodrigues is a 28 y.o.  female presents to the clinic for left foot evaluation    5/2/2023 stepped on a nail while moving palate in her backyard. Went through her shoe and into her foot. Bled a little bit; no swelling, drainage, redness    Chest pain onset 4/29/2023 with right flank radiated to right upper chest  Squeezing/puling 8/10 at worst, right now a 5/10  No fever, cough, rhinorrhea, n/v, abd pain, palpitation, diaphoresis  No recent URI  Alleviated by ibuprofen 400mg  Aggravate by leaning forward  No previous episodes  Works in warehouse lifts about 20lbs; no falls or trauma apart from above  Last Tdap 2007    ROS per HPI      Physical Exam    Vitals:    05/03/23 0941   BP: 131/83   Pulse: 68   Resp: 20   Temp: 98 °F (36.7 °C)     Wt Readings from Last 2 Encounters:   05/03/23 90.7 kg (200 lb)   03/23/23 90.7 kg (200 lb)     Gen: NAD  Pulm: Nonlabored, CTABL  CV: RRR, no MRG, no carotid/renal bruits, no peripheral edema  MSK: Chest without overlying skin changes, no deformities, no asymmetry. Tender right pectoralis muscle. No CVAT or right flank tenderness. No gait abnormalities, left foot with 1mm puncture wound on lateral plantar aspect, no swelling, erythema, bleeding, drainage, no fluctuance or induration. ROM intact and PT/DP 2+; Appropriately tender      Current Outpatient Medications  Current Outpatient Medications   Medication Instructions    albuterol (PROVENTIL HFA) 90 mcg/actuation inhaler 2 puffs, Inhalation, Every 6 hours PRN, Rescue    cetirizine (ZYRTEC) 5 mg, Oral, Daily    diclofenac sodium (VOLTAREN) 2 g, Topical (Top), Every 6-8 hours PRN    fluconazole (DIFLUCAN) 150 mg, Oral, Once    methocarbamoL (ROBAXIN) 750 mg, Oral, Every 6-8 hours PRN, Can be sedating, do not use while operating heavy machinery    nitrofurantoin,  macrocrystal-monohydrate, (MACROBID) 100 MG capsule 100 mg, Oral, 2 times daily             Assessment / Plan:    Foot trauma, left, initial encounter  No signs of infection on exam. Strict return precautions provided  -     Tdap (BOOSTRIX) vaccine injection 0.5 mL  -     X-Ray Foot Complete Left; Future; Expected date: 05/03/2023      Strain of right pectoralis muscle, initial encounter  Recommended conservative management with rest, heat, ice, massage and stretching. Handout provided  -     methocarbamoL (ROBAXIN) 750 MG Tab; Take 1 tablet (750 mg total) by mouth every 6 to 8 hours as needed. Can be sedating, do not use while operating heavy machinery  Dispense: 40 tablet; Refill: 0  -     diclofenac sodium (VOLTAREN) 1 % Gel; Apply 2 g topically every 6 to 8 hours as needed.  Dispense: 100 g; Refill: 0            Follow up:    In 2 weeks with PCP, or earlier if needed.     Krissy Shah MD  [employer] [facility] PGY-2

## 2023-05-03 NOTE — PROGRESS NOTES
I have reviewed the Resident's history and physical, assessment, plan, and progress note. I agree with the findings.       Ashok Berman MD  [facility]

## 2023-05-19 ENCOUNTER — OFFICE VISIT (OUTPATIENT)
Dept: FAMILY MEDICINE | Facility: CLINIC | Age: 28
End: 2023-05-19
Payer: COMMERCIAL

## 2023-05-19 VITALS
OXYGEN SATURATION: 100 % | SYSTOLIC BLOOD PRESSURE: 134 MMHG | BODY MASS INDEX: 32.28 KG/M2 | HEIGHT: 66 IN | DIASTOLIC BLOOD PRESSURE: 79 MMHG | TEMPERATURE: 98 F | HEART RATE: 86 BPM

## 2023-05-19 DIAGNOSIS — Z30.09 ENCOUNTER FOR COUNSELING REGARDING CONTRACEPTION: ICD-10-CM

## 2023-05-19 DIAGNOSIS — M54.9 BACK PAIN, UNSPECIFIED BACK LOCATION, UNSPECIFIED BACK PAIN LATERALITY, UNSPECIFIED CHRONICITY: Primary | ICD-10-CM

## 2023-05-19 DIAGNOSIS — R39.89 SUSPECTED UTI: ICD-10-CM

## 2023-05-19 LAB
APPEARANCE UR: CLEAR
BACTERIA #/AREA URNS AUTO: ABNORMAL /HPF
BILIRUB UR QL STRIP.AUTO: NEGATIVE MG/DL
COLOR UR: COLORLESS
GLUCOSE UR QL STRIP.AUTO: NORMAL MG/DL
HYALINE CASTS #/AREA URNS LPF: ABNORMAL /LPF
KETONES UR QL STRIP.AUTO: NEGATIVE MG/DL
LEUKOCYTE ESTERASE UR QL STRIP.AUTO: NEGATIVE UNIT/L
MUCOUS THREADS URNS QL MICRO: ABNORMAL /LPF
NITRITE UR QL STRIP.AUTO: NEGATIVE
PH UR STRIP.AUTO: 6.5 [PH]
PROT UR QL STRIP.AUTO: NEGATIVE MG/DL
RBC #/AREA URNS AUTO: ABNORMAL /HPF
RBC UR QL AUTO: NEGATIVE UNIT/L
SP GR UR STRIP.AUTO: 1.01
SQUAMOUS #/AREA URNS LPF: ABNORMAL /HPF
UROBILINOGEN UR STRIP-ACNC: NORMAL MG/DL
WBC #/AREA URNS AUTO: ABNORMAL /HPF

## 2023-05-19 PROCEDURE — 81001 URINALYSIS AUTO W/SCOPE: CPT

## 2023-05-19 PROCEDURE — 99213 OFFICE O/P EST LOW 20 MIN: CPT | Mod: PBBFAC

## 2023-05-19 PROCEDURE — 87088 URINE BACTERIA CULTURE: CPT

## 2023-05-19 NOTE — PROGRESS NOTES
"[facility] Family Medicine Office Visit Note    Subjective:       Patient ID: Heide Rodrigues is a 28 y.o. female.    Chief Complaint: F/U L FOOT TRAUMA      HPI:  28 y.o. female presents to [facility] clinic for f/u foot trauma, strain R pectoralis muscle.     Foot injury - see note from 5/3/203 for details  - no abn on XR  - injury completely healed    Birth control  - prev on depo, last injection 11/2022  - LMP 5/10/2023; first cycle since off depo; was previously usual 7d  - was on depo x6 yrs looking for new form of contraception - wants to have cycle  - was having "ovulation" pain without period; stopped since giving self a break  - tolerated patches well in the past  - wanted to discuss options to prepare for when pt is ready, not interested in starting contraception today    Concern of uti  - had UTI 1 month ago; s/p abx with resolution of sxs; having recurrence of sxs since for past few days - R low back pain; no dysuria, frequency, urgency  - requesting test for UTI    PMH:  Asthma    Health Maintenance  Pap Smear: 9/2021 - NIL, no HPV testing done    Review of Systems:  Gen: denies fever and chills  Resp: denies cough, shortness of breath and wheezing  CV: denies chest pain and palpitations  GI: denies nausea, vomiting, abdominal pain    Objective:      /79 (BP Location: Right arm, Patient Position: Sitting, BP Method: Large (Automatic))   Pulse 86   Temp 98.1 °F (36.7 °C) (Oral)   Ht 5' 6" (1.676 m)   LMP 05/10/2023 (Exact Date)   SpO2 100%   BMI 32.28 kg/m²     Physical Exam:  Gen: alert and oriented, NAD  CV: RRR, S1 and S2 present, no murmurs appreciated on exam  Resp: CTA bilaterally, breathing nonlabored on room air   Abd: Soft, non-distended, non-tender  : neg suprapubic tenderness, neg CVA tenderness  Skin: L foot with peeling superficial skin over lateral plantar aspect at location of MTP joint with 5th digitl no erythema, tenderness, drainage.  MSK: R low back with mild " "tenderness on exam, no ROM deficits; ambulating spontaneously without limp    Current Outpatient Medications   Medication Instructions    albuterol (PROVENTIL HFA) 90 mcg/actuation inhaler 2 puffs, Inhalation, Every 6 hours PRN, Rescue    cetirizine (ZYRTEC) 5 mg, Oral, Daily    diclofenac sodium (VOLTAREN) 2 g, Topical (Top), Every 6-8 hours PRN    fluconazole (DIFLUCAN) 150 mg, Oral, Once    nitrofurantoin, macrocrystal-monohydrate, (MACROBID) 100 MG capsule 100 mg, Oral, 2 times daily        Assessment/Plan:     1. Back pain, unspecified back location, unspecified back pain laterality, unspecified chronicity  Urinalysis    Urine Culture High Risk      2. Suspected UTI  Urinalysis    Urine Culture High Risk      3. Encounter for counseling regarding contraception            UA and urine culture ordered due to recurrence of sxs; sxs less consistent with classical UTI however  Low back pain more consistent with MSK in etiology, cont to monitor for now  Extensive discussion regarding contraceptive options; pt considering patches, but unsure. Does not want to start at this time, wants to give body a "break". Advised to continue barrier contraception if pregnancy is not desired at this time    "

## 2023-05-21 LAB — BACTERIA UR CULT: NO GROWTH

## 2023-05-21 NOTE — PROGRESS NOTES
Date of encounter 05-19-23.  Resident's note reviewed 05-21-23.  Agree with assessment; plan of care appropriate.  Professional services provided in an outpatient primary care center affiliated with a teaching institution.

## 2023-07-11 ENCOUNTER — PATIENT MESSAGE (OUTPATIENT)
Dept: RESEARCH | Facility: HOSPITAL | Age: 28
End: 2023-07-11
Payer: COMMERCIAL

## 2023-07-19 ENCOUNTER — PATIENT MESSAGE (OUTPATIENT)
Dept: RESEARCH | Facility: HOSPITAL | Age: 28
End: 2023-07-19
Payer: COMMERCIAL

## 2023-11-21 ENCOUNTER — HOSPITAL ENCOUNTER (EMERGENCY)
Facility: HOSPITAL | Age: 28
Discharge: HOME | End: 2023-11-21
Attending: EMERGENCY MEDICINE
Payer: COMMERCIAL

## 2023-11-21 VITALS
HEIGHT: 66 IN | OXYGEN SATURATION: 100 % | WEIGHT: 176.38 LBS | SYSTOLIC BLOOD PRESSURE: 112 MMHG | RESPIRATION RATE: 20 BRPM | TEMPERATURE: 98 F | DIASTOLIC BLOOD PRESSURE: 78 MMHG | BODY MASS INDEX: 28.34 KG/M2 | HEART RATE: 90 BPM

## 2023-11-21 DIAGNOSIS — R05.1 ACUTE COUGH: ICD-10-CM

## 2023-11-21 DIAGNOSIS — R07.89 ATYPICAL CHEST PAIN: Primary | ICD-10-CM

## 2023-11-21 DIAGNOSIS — R11.0 NAUSEA: ICD-10-CM

## 2023-11-21 LAB
B-HCG UR QL: NEGATIVE
CTP QC/QA: YES

## 2023-11-21 PROCEDURE — 99284 EMERGENCY DEPT VISIT MOD MDM: CPT | Mod: 25

## 2023-11-21 PROCEDURE — 93005 ELECTROCARDIOGRAM TRACING: CPT

## 2023-11-21 PROCEDURE — 81025 URINE PREGNANCY TEST: CPT | Performed by: PHYSICIAN ASSISTANT

## 2023-11-21 PROCEDURE — 25000003 PHARM REV CODE 250: Performed by: PHYSICIAN ASSISTANT

## 2023-11-21 RX ORDER — ONDANSETRON 4 MG/1
4 TABLET, ORALLY DISINTEGRATING ORAL
Status: COMPLETED | OUTPATIENT
Start: 2023-11-21 | End: 2023-11-21

## 2023-11-21 RX ORDER — ONDANSETRON 4 MG/1
4 TABLET, ORALLY DISINTEGRATING ORAL EVERY 8 HOURS PRN
Qty: 20 TABLET | Refills: 0 | Status: SHIPPED | OUTPATIENT
Start: 2023-11-21

## 2023-11-21 RX ADMIN — ONDANSETRON 4 MG: 4 TABLET, ORALLY DISINTEGRATING ORAL at 09:11

## 2023-11-22 NOTE — ED PROVIDER NOTES
Encounter Date: 2023       History     Chief Complaint   Patient presents with    Cough    Influenza     Pt c/o chest discomfort after coughing fits. Pt reports she was diagnosed with the flu on 23. She has been taking tamiflu as prescribed along with ibuprofen. Reports she was encouraged to come be seen after c/o chest pain. VssElijah Rodrigues is a 28 y.o. female with a history of asthma who presents to the ED complaining of chest pain with coughing. She was diagnosed with the flu 4 days ago and has been coughing a lot. Reports today she developed a sharp pain in her chest that occurs with coughing fits. She also feels like she is dehydrated because she is urinating less frequently. She endorses nausea. She was vomiting the first few days she was sick, but not anymore. She denies fevers, chills, SOB, palpitations, diarrhea.    The history is provided by the patient.     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   HENT:  Negative for congestion and sore throat.    Eyes:  Negative for redness and itching.   Respiratory:  Positive for cough. Negative for shortness of breath.    Cardiovascular:  Positive for chest pain. Negative for palpitations and leg swelling.   Gastrointestinal:  Positive for nausea. Negative for abdominal pain, diarrhea and vomiting.   Genitourinary:  Negative for dysuria  and frequency.   Musculoskeletal:  Negative for arthralgias and back pain.   Skin:  Negative for rash and wound.   Neurological:  Negative for dizziness and weakness.   Hematological:  Does not bruise/bleed easily.       Physical Exam     Initial Vitals [11/21/23 2006]   BP Pulse Resp Temp SpO2   116/80 102 20 98.2 °F (36.8 °C) 100 %      MAP       --         Physical Exam    Nursing note and vitals reviewed.  Constitutional: She appears well-developed and well-nourished. No distress.   HENT:   Head: Normocephalic and atraumatic.   Mouth/Throat: No oropharyngeal exudate.   Eyes: EOM are normal. No scleral icterus.   Mucous membranes moist   Neck: Neck supple.   Normal range of motion.  Cardiovascular:  Normal rate and regular rhythm.           No murmur heard.  Pulmonary/Chest: Breath sounds normal. No respiratory distress. She has no wheezes. She exhibits no tenderness.   Abdominal: Abdomen is soft. Bowel sounds are normal. She exhibits no distension. There is no abdominal tenderness.   Musculoskeletal:         General: No tenderness. Normal range of motion.      Cervical back: Normal range of motion and neck supple.     Neurological: She is alert and oriented to person, place, and time. No cranial nerve deficit.   Skin: Skin is warm and dry. Capillary refill takes less than 2 seconds. No erythema.   Normal cap refill   Psychiatric: She has a normal mood and affect. Her behavior is normal. Judgment and thought content normal.         ED Course   Procedures  Labs Reviewed   POCT URINE PREGNANCY          Imaging Results              X-Ray Chest PA And Lateral (Preliminary result)  Result time 11/21/23 21:28:52      Wet Read by Anna Julien, SANDI (11/21/23 21:28:52, [facility], Emergency Medicine)    No acute cardiopulmonary process identified                                     Medications   ondansetron disintegrating tablet 4 mg (4 mg Oral Given 11/21/23 3287)     Medical Decision  Making  Initial assessment: resting comfortably in NAD. Hds and afebrile. Lungs CTA bilaterally. RRR    Differential diagnosis: costochondritis, pleurisy, pneumonia, among others    Clinical tests/ED management: CXR without pleural effusion or consolidation on my read. EKG with NSR, no ischemic changes. Pt nausea improved with zofran. She is tolerating PO. Stable for discharge home. Encouraged NSAIDs prn chest discomfort. Patient states she is already prescribed cough medicine and it is helping. Encouraged close follow up with PCP. ED return precautions given for any new or worsening symptoms. She verbalized understanding. All questions answered.     Amount and/or Complexity of Data Reviewed  Labs: ordered.  Radiology: ordered and independent interpretation performed.  ECG/medicine tests:  Decision-making details documented in ED Course.    Risk  Prescription drug management.               ED Course as of 11/21/23 2217 Tue Nov 21, 2023 2146 EKG 12-lead  NSR, no ST elevations or depressions. No TWI [KD]      ED Course User Index  [KD] Anna Julien PA-C                        Clinical Impression:  Final diagnoses:  [R07.89] Atypical chest pain (Primary)  [R05.1] Acute cough  [R11.0] Nausea          ED Disposition Condition    Discharge Stable          ED Prescriptions       Medication Sig Dispense Start Date End Date Auth. Provider    ondansetron (ZOFRAN-ODT) 4 MG TbDL Take 1 tablet (4 mg total) by mouth every 8 (eight) hours as needed (nausea/vomiting). 20 tablet 11/21/2023 -- Anna Julien PA-C          Follow-up Information       Follow up With Specialties Details Why Contact Info [facility] Emergency Medicine  If symptoms worsen [address] [facility] [place] [address] [phone]    Ro Alcocer MD Family Medicine In 3 days Hospital follow up [address] [facility] [id number]  [phone]               Anna Julien PA-C  11/21/23  2872

## 2024-03-14 NOTE — PROGRESS NOTES
Cheyenne Ruth Ashley R, MD  Caller: Unspecified (1 month ago)  Called Pt and she stated she never went to the hospital and does not want an appt.  Thank you          Previous Messages       ----- Message -----  From: Latonia Ogden MD  Sent: 2/11/2024   7:46 PM CST  To: Ro Alcocer MD; Cheyenne Ruth  Subject: follow up                                        Please call patient to schedule ED follow up. Needs repeat x-ray vs CT chest.    Latonia Ogden MD  [employer]  Resident, -III

## 2024-03-15 ENCOUNTER — OFFICE VISIT (OUTPATIENT)
Dept: FAMILY MEDICINE | Facility: CLINIC | Age: 29
End: 2024-03-15
Payer: COMMERCIAL

## 2024-03-15 VITALS
TEMPERATURE: 99 F | HEART RATE: 81 BPM | SYSTOLIC BLOOD PRESSURE: 123 MMHG | BODY MASS INDEX: 31.82 KG/M2 | DIASTOLIC BLOOD PRESSURE: 79 MMHG | HEIGHT: 66 IN | WEIGHT: 198 LBS | OXYGEN SATURATION: 100 %

## 2024-03-15 DIAGNOSIS — Z32.01 PREGNANCY TEST POSITIVE: Primary | ICD-10-CM

## 2024-03-15 LAB
B-HCG UR QL: POSITIVE
CTP QC/QA: YES

## 2024-03-15 PROCEDURE — 99215 OFFICE O/P EST HI 40 MIN: CPT | Mod: PBBFAC

## 2024-03-15 RX ORDER — CHLOPHEDIANOL HCL AND PYRILAMINE MALEATE 12.5; 12.5 MG/5ML; MG/5ML
10 SOLUTION ORAL
COMMUNITY
Start: 2023-11-18

## 2024-03-15 NOTE — PROGRESS NOTES
"Family Medicine Clinic Note     Subjective     Patient ID: Heide Rodrigues is a 28 y.o. female.    Chief Complaint: UPT    27 yo female presents in clinic for confirmation of pregnancy.     Confirmation of pregnancy  -4 at-home positive pregnancy tests  -LMP beginning of February. Lasted full 5 days. Spotting present beginning of March.   -Denies any nausea/vomiting  -Denies any complications with prior pregnancies          Review of Systems   HENT:  Negative for hearing loss.    Eyes:  Negative for discharge.   Respiratory:  Negative for wheezing.    Cardiovascular:  Negative for chest pain and palpitations.   Gastrointestinal:  Negative for blood in stool, constipation, diarrhea and vomiting.   Genitourinary:  Negative for dysuria and hematuria.   Musculoskeletal:  Negative for neck pain.   Neurological:  Negative for weakness and headaches.      ROS submitted by patient prior to appointment.   Objective     Vitals:    03/15/24 1013   BP: 123/79   BP Location: Right arm   Patient Position: Sitting   BP Method: Medium (Automatic)   Pulse: 81   Temp: 98.5 °F (36.9 °C)   TempSrc: Oral   SpO2: 100%   Weight: 89.8 kg (198 lb)   Height: 5' 6" (1.676 m)        Medication List with Changes/Refills   Current Medications    ALBUTEROL (PROVENTIL HFA) 90 MCG/ACTUATION INHALER    Inhale 2 puffs into the lungs every 6 (six) hours as needed for Wheezing. Rescue    CETIRIZINE (ZYRTEC) 5 MG TABLET    Take 1 tablet (5 mg total) by mouth once daily.    DICLOFENAC SODIUM (VOLTAREN) 1 % GEL    Apply 2 g topically every 6 to 8 hours as needed.    FLUCONAZOLE (DIFLUCAN) 150 MG TAB    Take 150 mg by mouth once.    NINJACOF 12.5-12.5 MG/5 ML LIQD    Take 10 mLs by mouth.    NITROFURANTOIN, MACROCRYSTAL-MONOHYDRATE, (MACROBID) 100 MG CAPSULE    Take 100 mg by mouth 2 (two) times daily.    ONDANSETRON (ZOFRAN-ODT) 4 MG TBDL    Take 1 tablet (4 mg total) by mouth every 8 (eight) hours as needed (nausea/vomiting).        Physical " Exam  Vitals and nursing note reviewed.   Constitutional:       Appearance: Normal appearance.   Cardiovascular:      Rate and Rhythm: Normal rate and regular rhythm.   Pulmonary:      Effort: Pulmonary effort is normal.      Breath sounds: Normal breath sounds. No wheezing.   Abdominal:      General: Bowel sounds are normal.      Palpations: Abdomen is soft.      Tenderness: There is no abdominal tenderness.   Musculoskeletal:      Right lower leg: No edema.      Left lower leg: No edema.   Neurological:      Mental Status: She is alert.       Assessment and Plan      1. Pregnancy test positive      -In office pregnancy test positive. Patient excited for pregnancy.  -Patient has appointment scheduled with Dr. Johansen to establish OB care.   -Patient has no other acute concerns at this time. Will follow up for annual wellness in 6 weeks with pcp.    Orders:  -     POCT Urine Pregnancy       Follow up in about 6 weeks (around 4/26/2024) for wellness.      Willy Reddy MD  [facility] Family Medicine -II

## 2024-03-21 ENCOUNTER — LAB VISIT (OUTPATIENT)
Dept: LAB | Facility: HOSPITAL | Age: 29
End: 2024-03-21
Attending: OBSTETRICS & GYNECOLOGY
Payer: COMMERCIAL

## 2024-03-21 DIAGNOSIS — Z34.01 ENCOUNTER FOR SUPERVISION OF NORMAL FIRST PREGNANCY IN FIRST TRIMESTER: Primary | ICD-10-CM

## 2024-03-21 LAB — B-HCG FREE SERPL-ACNC: 2035.34 MIU/ML

## 2024-03-21 PROCEDURE — 84702 CHORIONIC GONADOTROPIN TEST: CPT

## 2024-03-21 PROCEDURE — 36415 COLL VENOUS BLD VENIPUNCTURE: CPT

## 2024-03-26 ENCOUNTER — OFFICE VISIT (OUTPATIENT)
Dept: FAMILY MEDICINE | Facility: CLINIC | Age: 29
End: 2024-03-26
Payer: COMMERCIAL

## 2024-03-26 ENCOUNTER — HOSPITAL ENCOUNTER (OUTPATIENT)
Dept: RADIOLOGY | Facility: HOSPITAL | Age: 29
Discharge: HOME | End: 2024-03-26
Payer: COMMERCIAL

## 2024-03-26 VITALS
OXYGEN SATURATION: 98 % | DIASTOLIC BLOOD PRESSURE: 82 MMHG | TEMPERATURE: 98 F | SYSTOLIC BLOOD PRESSURE: 129 MMHG | HEIGHT: 66 IN | BODY MASS INDEX: 32.04 KG/M2 | WEIGHT: 199.38 LBS | RESPIRATION RATE: 18 BRPM | HEART RATE: 72 BPM

## 2024-03-26 DIAGNOSIS — O20.9 BLEEDING IN EARLY PREGNANCY: ICD-10-CM

## 2024-03-26 DIAGNOSIS — O20.9 BLEEDING IN EARLY PREGNANCY: Primary | ICD-10-CM

## 2024-03-26 LAB
B-HCG UR QL: POSITIVE
CTP QC/QA: YES

## 2024-03-26 PROCEDURE — 81025 URINE PREGNANCY TEST: CPT | Mod: PBBFAC

## 2024-03-26 PROCEDURE — 99215 OFFICE O/P EST HI 40 MIN: CPT | Mod: PBBFAC,25

## 2024-03-26 PROCEDURE — 76817 TRANSVAGINAL US OBSTETRIC: CPT | Mod: TC

## 2024-03-26 NOTE — PROGRESS NOTES
[facility] FM Clinic Progress Note    ID:  Heide Rodrigues   MRN:  [id number]     3/26/2024    Chief Complaint:    Chief Complaint   Patient presents with    Threatened Miscarriage     History of Present Illness:  Heide Rodrigues is a 28 y.o. female who presents to [facility] FM clinic for:     Conditions addressed this visit:  - bleeding during pregnancy:  with positive UPT on 03/15/2024. Intermittent vaginal bleeding started 4 days ago, reports passing large clots. States similar to regular menses. Bleeding as since stopped. Denies cramping, n/v. LMP date unknown, believes 2024. Apt with Dr Lukas Johansen on 2024.  - Abnormal CXR: CT scan in 2023 shows concern for scarring of right upper lobe. Recommended 3mo f/u, however patient pregnant. Denies fever, current or previous tobacco use.      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 She has never used smokeless tobacco. She reports that she does not drink alcohol and does not use drugs.  FH: family history includes Diabetes Mellitus in her mother; Hypertension in her father and mother.    Medication List with Changes/Refills   Current Medications    ALBUTEROL (PROVENTIL HFA) 90 MCG/ACTUATION INHALER    Inhale 2 puffs into the lungs every 6 (six) hours as needed for Wheezing. Rescue    CETIRIZINE (ZYRTEC) 5 MG TABLET    Take 1 tablet (5 mg total) by mouth once daily.    DICLOFENAC SODIUM (VOLTAREN) 1 % GEL    Apply 2 g topically  "every 6 to 8 hours as needed.    FLUCONAZOLE (DIFLUCAN) 150 MG TAB    Take 150 mg by mouth once.    NINJACOF 12.5-12.5 MG/5 ML LIQD    Take 10 mLs by mouth.    NITROFURANTOIN, MACROCRYSTAL-MONOHYDRATE, (MACROBID) 100 MG CAPSULE    Take 100 mg by mouth 2 (two) times daily.    ONDANSETRON (ZOFRAN-ODT) 4 MG TBDL    Take 1 tablet (4 mg total) by mouth every 8 (eight) hours as needed (nausea/vomiting).       Review of Systems:  ROS reviewed with patient and updated below.  Review of Systems   Constitutional:  Negative for fever.   HENT:  Negative for congestion.    Respiratory:  Negative for shortness of breath.    Cardiovascular:  Negative for chest pain.   Gastrointestinal:  Negative for abdominal pain.   Genitourinary:  Positive for vaginal bleeding. Negative for dysuria.   Skin:  Negative for color change.   Neurological:  Negative for dizziness.   Pertinent positives and negatives as mentioned in HPI    Objective:  Vitals:    03/26/24 1413   BP: 129/82   Pulse: 72   Resp: 18   Temp: 98 °F (36.7 °C)   TempSrc: Oral   SpO2: 98%   Weight: 90.4 kg (199 lb 6.4 oz)   Height: 5' 6" (1.676 m)        Physical Exam  Constitutional:       General: She is not in acute distress.  HENT:      Head: Normocephalic.   Eyes:      Extraocular Movements: Extraocular movements intact.      Conjunctiva/sclera: Conjunctivae normal.   Cardiovascular:      Rate and Rhythm: Normal rate and regular rhythm.      Pulses: Normal pulses.      Heart sounds: Normal heart sounds. No murmur heard.     No gallop.   Pulmonary:      Effort: No respiratory distress.      Breath sounds: No wheezing or rales.   Abdominal:      General: Bowel sounds are normal.      Tenderness: There is no abdominal tenderness. There is no guarding.   Musculoskeletal:         General: Normal range of motion.   Skin:     General: Skin is warm and dry.      Coloration: Skin is not jaundiced.   Neurological:      Mental Status: She is alert and oriented to person, place, and " time.   Psychiatric:         Mood and Affect: Mood normal.         Behavior: Behavior normal.         Thought Content: Thought content normal.         US OB Transvaginal  Narrative: EXAMINATION:  US OB TRANSVAGINAL    CLINICAL HISTORY:  Hemorrhage in early pregnancy, unspecified    TECHNIQUE:  Transvaginal images obtained.    COMPARISON:  None    FINDINGS:  Uterus measures 8.6 cm.  Small amount of hypoechoic fluid is seen within the endometrial cavity and cervical canal.  No gestational sac or fetal pole is present.    Right ovary measures 2.7 x 1.6 x 1.3 cm.  Left ovary measures 2.9 x 1.9 x 2.4 cm.  Probable corpus luteum is on the left ovary.  Normal vascular flow is noted to the ovaries.  No adnexal masses or free fluid.  Impression: 1. No evidence of intra or extra uterine gestation.  Beta HCG follow-up is recommended with ultrasound as needed.    Electronically signed by: Joseph Whelan MD  Date:    2024  Time:    19:06        Assessment/Plan:  Heide was seen today for possible threatened miscarriage vs ectopic pregnancy vs complete or partial . Beta HCG down trending from  five days ago to 1326 in clinic today with positive UDP.     Diagnoses and all orders for this visit:    Bleeding in early pregnancy  -     Ddx includes complete , incomplete , ectopic pregnancy  -     Patient voices understanding about concern for miscarriage vs ectopic pregnancy.   -     POCT urine pregnancy: positive  -     CBC Auto Differential hgb 11.2, down from baseline of ~14.0  -     HCG, Quantitative: 1326, down from 2000 approx 5 days ago  -     Type & Screen: O+, RAMIRO-  -     US OB Transvaginal as above  -     Patient advised to contact Dr. Lukas SILVERIO to follow up on U/S and HCG results. She states she will call OBGYN's tomorrow for earliest available appointment   -     Ambulatory referral to OBGYN consult also placed if unable to follow up with primary OB  -     Patient given precautions  if she is unable to schedule OB visit with Dr. Johansen or [facility] OB within the next couple days should return to clinic for weekly hcg and/or repeat u/s or if symptoms worsen    Abnormal CXR  -     Discussed CXR results from 11/21/23 concerning for nodular opacities in right upper lobe. Suggest repeat CXR on subsequent visit.      Follow up in about 4 weeks (around 4/23/2024), or if symptoms worsen or fail to improve, for routine follow up.    Future Appointments   Date Time Provider Department Center   4/23/2024  3:20 PM Ro Alcocer MD [facility] [place] Arturo Alcocer MD  [facility], HO-II

## 2024-03-27 NOTE — PROGRESS NOTES
Faculty Attestation: Heide Rodrigues  was seen in Family Medicine Clinic. Discussed with resident at the time of the visit. History of Present Illness, Physical Exam, and Assessment and Plan reviewed. Treatment plan is reasonable and appropriate. Compliance with treatment recommendations is important.         Desean Osorio MD  Sports Medicine

## 2024-04-05 ENCOUNTER — LAB VISIT (OUTPATIENT)
Dept: LAB | Facility: HOSPITAL | Age: 29
End: 2024-04-05
Attending: OBSTETRICS & GYNECOLOGY
Payer: COMMERCIAL

## 2024-04-05 DIAGNOSIS — Z34.80 PRENATAL CARE, SUBSEQUENT PREGNANCY: Primary | ICD-10-CM

## 2024-04-05 LAB — B-HCG FREE SERPL-ACNC: 299.16 MIU/ML

## 2024-04-05 PROCEDURE — 84702 CHORIONIC GONADOTROPIN TEST: CPT

## 2024-04-05 PROCEDURE — 36415 COLL VENOUS BLD VENIPUNCTURE: CPT

## 2024-04-23 ENCOUNTER — OFFICE VISIT (OUTPATIENT)
Dept: FAMILY MEDICINE | Facility: CLINIC | Age: 29
End: 2024-04-23
Payer: COMMERCIAL

## 2024-04-23 VITALS
DIASTOLIC BLOOD PRESSURE: 68 MMHG | WEIGHT: 199 LBS | OXYGEN SATURATION: 100 % | BODY MASS INDEX: 31.98 KG/M2 | SYSTOLIC BLOOD PRESSURE: 103 MMHG | HEART RATE: 79 BPM | TEMPERATURE: 98 F | HEIGHT: 66 IN

## 2024-04-23 DIAGNOSIS — O03.9 MISCARRIAGE: Primary | ICD-10-CM

## 2024-04-23 PROCEDURE — 99214 OFFICE O/P EST MOD 30 MIN: CPT | Mod: PBBFAC

## 2024-04-23 NOTE — PROGRESS NOTES
[facility] FM Clinic Progress Note    ID:  Heide Rodrigues   MRN:  [id number]     2024    Chief Complaint:    Chief Complaint   Patient presents with    Vaginal Bleeding     History of Present Illness:  Heide Rodrigues is a 29 y.o. female who presents to [facility] FM clinic for:     Conditions addressed this visit:  - bleeding during pregnancy:  with positive UPT on 03/15/2024 followed by vaginal bleeding with passing of large clots was seen in clinic on 2024 with resolution of symptoms at that time. Reports now she has intermittent bleeding and spotting since last visit along with left sided pelvic pain. Denies fever, n/v. LMP date unknown, believes 2024. Reports regular menses prior. Was seen by ob/gyn, however no longer following d/t insurance issue. Reports intercourse approx 1 week ago. Denies hx of anemia or transfusion.     Conditions not addressed this visit:  - Abnormal CXR: CT scan in 2023 shows concern for scarring of right upper lobe. Recommended 3mo f/u, however patient pregnant. Denies fever, current or previous tobacco use.     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       Medication List with Changes/Refills   Current Medications    ALBUTEROL (PROVENTIL HFA) 90 MCG/ACTUATION INHALER    Inhale 2 puffs into the lungs every 6 (six) hours as needed for Wheezing. Rescue    CETIRIZINE (ZYRTEC) 5 MG TABLET    Take 1 tablet (5 mg total) by mouth once daily.    DICLOFENAC SODIUM (VOLTAREN) 1 % GEL    Apply 2 g topically every 6 to 8 hours as needed.    FLUCONAZOLE (DIFLUCAN) 150 MG TAB    Take 150 mg by mouth once.    NINJACOF 12.5-12.5 MG/5 ML LIQD    Take 10 mLs by mouth.    NITROFURANTOIN, MACROCRYSTAL-MONOHYDRATE, (MACROBID) 100 MG CAPSULE    Take 100 mg by mouth 2 (two) times daily.    ONDANSETRON (ZOFRAN-ODT) 4 MG TBDL    Take 1 tablet (4 mg total) by mouth every 8 (eight) hours as needed (nausea/vomiting).       Review of Systems:  ROS reviewed with patient and updated below.  Review of Systems   Constitutional:  Negative for fever.   HENT:  Negative for congestion.    Respiratory:  Negative for shortness of breath.    Cardiovascular:  Negative for chest pain.   Gastrointestinal:  Negative for abdominal pain.   Genitourinary:  Positive for vaginal bleeding. Negative for dysuria.   Skin:  Negative for color change.   Neurological:  Negative for dizziness.   Pertinent positives and negatives as mentioned in HPI    Objective:    Vitals:    04/23/24 1559   BP: 103/68   Pulse: 79   Temp: 98.1 °F (36.7 °C)       Physical Exam  Exam conducted with a chaperone present.   Genitourinary:     Pubic Area: No rash.       Labia:         Right: No rash.         Left: No rash.       Cervix: Cervical bleeding present. No discharge, friability, erythema or eversion.             US OB Transvaginal  Narrative: EXAMINATION:  US OB TRANSVAGINAL    CLINICAL HISTORY:  Hemorrhage in early pregnancy, unspecified    TECHNIQUE:  Transvaginal images obtained.    COMPARISON:  None    FINDINGS:  Uterus measures 8.6 cm.  Small amount of hypoechoic fluid is seen within the endometrial cavity and  cervical canal.  No gestational sac or fetal pole is present.    Right ovary measures 2.7 x 1.6 x 1.3 cm.  Left ovary measures 2.9 x 1.9 x 2.4 cm.  Probable corpus luteum is on the left ovary.  Normal vascular flow is noted to the ovaries.  No adnexal masses or free fluid.  Impression: 1. No evidence of intra or extra uterine gestation.  Beta HCG follow-up is recommended with ultrasound as needed.    Electronically signed by: Joseph Whelan MD  Date:    2024  Time:    19:06        Assessment/Plan:  Heide was seen today for vaginal bleeding.    Diagnoses and all orders for this visit:    Miscarriage  -     ddx includes menses, complete , pregnancy, AUB  -     obtain HCG, Quantitative; Future  -     Ambulatory referral/consult to Gynecology; Future  -     will continue with serial hcg until apt with GYN  -     repeat u/s ordered  -     blood type O+ with neg RAMIRO      Follow up in about 3 weeks (around 2024).    Future Appointments   Date Time Provider Department Center   2024  2:40 PM Ro Alcocer MD [facility] Arturo Alcocer MD  [facility], [facility]

## 2024-04-24 NOTE — PROGRESS NOTES
Faculty Attestation: Heide Rodrigues  was seen in Family Medicine Clinic. Discussed with resident at the time of the visit. History of Present Illness, Physical Exam, and Assessment and Plan reviewed. Treatment plan is reasonable and appropriate. Compliance with treatment recommendations is important.       Felisa Garcia MD  Family Medicine

## 2024-04-25 ENCOUNTER — LAB VISIT (OUTPATIENT)
Dept: LAB | Facility: HOSPITAL | Age: 29
End: 2024-04-25
Payer: COMMERCIAL

## 2024-04-25 DIAGNOSIS — O03.9 MISCARRIAGE: ICD-10-CM

## 2024-04-25 LAB — B-HCG FREE SERPL-ACNC: 5.7 MIU/ML

## 2024-04-25 PROCEDURE — 84702 CHORIONIC GONADOTROPIN TEST: CPT

## 2024-04-25 PROCEDURE — 36415 COLL VENOUS BLD VENIPUNCTURE: CPT

## 2024-05-06 ENCOUNTER — DOCUMENTATION ONLY (OUTPATIENT)
Dept: FAMILY MEDICINE | Facility: CLINIC | Age: 29
End: 2024-05-06
Payer: COMMERCIAL

## 2024-05-06 NOTE — PROGRESS NOTES
Asked by nursing staff to speak with patient regarding need for pelvic ultrasound.    Patient reports recent SAB in 3/2024. Had intermittent spotting x 3 weeks.  Last visit with pcp 4/23/24:  reported light spotting. Pelvic ultrasound gyn referral ordered.   bHCG  trended down 1326 -> 299 -> 5.70    Patient reports menstrual cycle started on 4/24/24. Lasted 5 days. This bleeding was similar to her usual cycles. Denied heavy bleeding, cramping, pain.  Feels back to normal and desires to not proceed with pelvic ultrasound tomorrow.     Risks/benefits discussed. Clinic/ED precautions given.

## 2024-06-26 ENCOUNTER — OFFICE VISIT (OUTPATIENT)
Dept: FAMILY MEDICINE | Facility: CLINIC | Age: 29
End: 2024-06-26
Payer: COMMERCIAL

## 2024-06-26 VITALS
OXYGEN SATURATION: 98 % | WEIGHT: 194 LBS | DIASTOLIC BLOOD PRESSURE: 87 MMHG | HEART RATE: 91 BPM | BODY MASS INDEX: 31.18 KG/M2 | HEIGHT: 66 IN | TEMPERATURE: 99 F | SYSTOLIC BLOOD PRESSURE: 136 MMHG

## 2024-06-26 DIAGNOSIS — Z30.42 ENCOUNTER FOR DEPO-PROVERA CONTRACEPTION: Primary | ICD-10-CM

## 2024-06-26 LAB
B-HCG UR QL: NEGATIVE
CTP QC/QA: YES

## 2024-06-26 PROCEDURE — 99213 OFFICE O/P EST LOW 20 MIN: CPT | Mod: PBBFAC

## 2024-06-26 RX ORDER — MEDROXYPROGESTERONE ACETATE 150 MG/ML
150 INJECTION, SUSPENSION INTRAMUSCULAR
Status: COMPLETED | OUTPATIENT
Start: 2024-06-26 | End: 2024-06-26

## 2024-06-26 RX ORDER — MEDROXYPROGESTERONE ACETATE 150 MG/ML
150 INJECTION, SUSPENSION INTRAMUSCULAR ONCE
Qty: 1 ML | Refills: 0 | Status: CANCELLED | OUTPATIENT
Start: 2024-06-26 | End: 2024-06-26

## 2024-06-26 RX ADMIN — MEDROXYPROGESTERONE ACETATE 150 MG: 150 INJECTION, SUSPENSION INTRAMUSCULAR at 11:06

## 2024-06-26 NOTE — PROGRESS NOTES
"[facility] OFFICE VISIT NOTE  Heide Rodrigues  [id number]  2024    Chief Complaint   Patient presents with    Contraception     Depo Shot     HPI  Heide Rodrigues is a 29 y.o.  female  presenting to [facility] for depo shot.    Recent SAB 3/2024. bHCG  trended down 1326 -> 299 -> 5.70. Pelvic US was ordered 2024 but never completed. Pt was referred to gynecology clinic but cancelled her appointment. Pt denies any recent abdominal/pelvic pain, intermenstrual bleeding, N/V, fever.     LMP: -  Last Depo: 1 year ago     PMH:  Asthma   Non-smoker   No hx of blood clots    Review of Systems   Constitutional:  Negative for fever.   Gastrointestinal:  Negative for abdominal pain, nausea and vomiting.   Genitourinary:  Negative for menstrual problem and pelvic pain.       Blood pressure 136/87, pulse 91, temperature 98.5 °F (36.9 °C), temperature source Oral, height 5' 6" (1.676 m), weight 88 kg (194 lb), last menstrual period 2024, SpO2 98%.   Physical Exam  Vitals reviewed.   Constitutional:       Appearance: She is not ill-appearing.   Cardiovascular:      Rate and Rhythm: Normal rate and regular rhythm.   Pulmonary:      Effort: Pulmonary effort is normal.      Breath sounds: Normal breath sounds.   Abdominal:      General: Abdomen is flat. There is no distension.      Palpations: Abdomen is soft.      Tenderness: There is no abdominal tenderness. There is no guarding.   Neurological:      Mental Status: She is alert.   Psychiatric:         Thought Content: Thought content normal.         Current Medications:   Current Outpatient Medications   Medication Sig Dispense Refill    albuterol (PROVENTIL HFA) 90 mcg/actuation inhaler Inhale 2 puffs into the lungs every 6 (six) hours as needed for Wheezing. Rescue 18 g 3    cetirizine (ZYRTEC) 5 MG tablet Take 1 tablet (5 mg total) by mouth once daily. 90 tablet 1    diclofenac sodium (VOLTAREN) 1 % Gel Apply 2 g topically every 6 to 8 hours as needed. " 100 g 0    fluconazole (DIFLUCAN) 150 MG Tab Take 150 mg by mouth once.      NINJACOF 12.5-12.5 mg/5 mL Liqd Take 10 mLs by mouth.      nitrofurantoin, macrocrystal-monohydrate, (MACROBID) 100 MG capsule Take 100 mg by mouth 2 (two) times daily.      ondansetron (ZOFRAN-ODT) 4 MG TbDL Take 1 tablet (4 mg total) by mouth every 8 (eight) hours as needed (nausea/vomiting). 20 tablet 0     No current facility-administered medications for this visit.       Assessment:   1. Encounter for Depo-Provera contraception        Plan:  - UPT negative today  - Depo shot given  - RTC in 3 months for next Depo    Orders Placed This Encounter    POCT urine pregnancy    medroxyPROGESTERone (DEPO-PROVERA) injection 150 mg         Katerina Andres DO  [employer]  [facility]

## 2024-09-26 ENCOUNTER — OFFICE VISIT (OUTPATIENT)
Dept: FAMILY MEDICINE | Facility: CLINIC | Age: 29
End: 2024-09-26
Payer: COMMERCIAL

## 2024-09-26 VITALS
BODY MASS INDEX: 31.98 KG/M2 | WEIGHT: 199 LBS | SYSTOLIC BLOOD PRESSURE: 114 MMHG | TEMPERATURE: 98 F | DIASTOLIC BLOOD PRESSURE: 77 MMHG | OXYGEN SATURATION: 100 % | HEART RATE: 84 BPM | HEIGHT: 66 IN | RESPIRATION RATE: 20 BRPM

## 2024-09-26 DIAGNOSIS — R93.89 ABNORMAL CXR (CHEST X-RAY): ICD-10-CM

## 2024-09-26 DIAGNOSIS — Z11.3 SCREEN FOR STD (SEXUALLY TRANSMITTED DISEASE): ICD-10-CM

## 2024-09-26 DIAGNOSIS — Z12.4 ENCOUNTER FOR PAPANICOLAOU SMEAR OF CERVIX: ICD-10-CM

## 2024-09-26 DIAGNOSIS — Z30.42 ENCOUNTER FOR DEPO-PROVERA CONTRACEPTION: Primary | ICD-10-CM

## 2024-09-26 LAB
C TRACH DNA SPEC QL NAA+PROBE: NOT DETECTED
CLUE CELLS VAG QL WET PREP: NORMAL
N GONORRHOEA DNA SPEC QL NAA+PROBE: NOT DETECTED
SOURCE (OHS): NORMAL
T VAGINALIS VAG QL WET PREP: NORMAL
WBC #/AREA VAG WET PREP: NORMAL
YEAST SPEC QL WET PREP: NORMAL

## 2024-09-26 PROCEDURE — 99213 OFFICE O/P EST LOW 20 MIN: CPT | Mod: PBBFAC

## 2024-09-26 PROCEDURE — 87210 SMEAR WET MOUNT SALINE/INK: CPT

## 2024-09-26 PROCEDURE — 87591 N.GONORRHOEAE DNA AMP PROB: CPT

## 2024-09-26 PROCEDURE — 87491 CHLMYD TRACH DNA AMP PROBE: CPT

## 2024-09-26 PROCEDURE — 96372 THER/PROPH/DIAG INJ SC/IM: CPT | Mod: PBBFAC

## 2024-09-26 RX ORDER — MEDROXYPROGESTERONE ACETATE 150 MG/ML
150 INJECTION, SUSPENSION INTRAMUSCULAR
Status: COMPLETED | OUTPATIENT
Start: 2024-09-26 | End: 2024-09-26

## 2024-09-26 RX ADMIN — MEDROXYPROGESTERONE ACETATE 150 MG: 150 INJECTION, SUSPENSION INTRAMUSCULAR at 08:09

## 2024-09-26 NOTE — PROGRESS NOTES
[facility] Progress Note    ID:  Heide Rodrigues   MRN:  [id number]     9/27/2024    Chief Complaint:    Chief Complaint   Patient presents with    Contraception     depo     History of Present Illness:  Heide Rodrigues is a 29 y.o. female who presents to [facility] clinic for contraception mgt.    Contraception mgt:  - current method is Depo-Provera injections.  - Last injection 6/26/2024. Restarted depo at that time with prior use >1 year.   - Reported minor breakthrough bleeding after dose, however has resolved and has not had menses since.   - Last pap 9/22/2021 NIL  - Requests STD screening/testing today.    Abnormal CXR:   - CXR in 11/2023 shows concern for scarring of right upper lobe.  - recommend repeat CXR f/u   - Denies fever, current or previous tobacco use, SOB, hemoptysis       Healthcare Maintenance:   - Hep C Screening: NR 2017  - HIV screening: NR 2018  - Cervical Cancer Screening: NIL in 09/2021  - Lipid Panel: 85 in 2021  - Tetanus Vaccine: 05/03/2023  - Pneumococcal vaccine: discuss at next visit   - contraception: Depo        Care Team:  - OBGYN: Dr Johansen     Review of Systems:  Pertinent positives and negatives as mentioned in HPI    Objective:    Vitals:    09/26/24 0832   BP: 114/77   Pulse: 84   Resp: 20   Temp: 97.9 °F (36.6 °C)       Physical Exam  Vitals reviewed. Exam conducted with a chaperone present.   Eyes:      Extraocular Movements: Extraocular movements intact.   Cardiovascular:      Rate and Rhythm: Normal rate and regular rhythm.      Heart sounds: No murmur heard.  Pulmonary:      Effort: Pulmonary effort is normal. No respiratory distress.      Breath sounds: No stridor. No wheezing or rales.   Genitourinary:     Labia:         Right: No rash or lesion.       Vagina: No vaginal discharge, erythema, tenderness, bleeding or lesions.      Cervix: No cervical motion tenderness, discharge, friability, lesion, erythema or cervical bleeding.   Skin:     General: Skin is warm and dry.       Coloration: Skin is not pale.   Neurological:      Mental Status: She is alert and oriented to person, place, and time.   Psychiatric:         Behavior: Behavior normal.            Assessment/Plan:  Heide was seen today for contraception.    Diagnoses and all orders for this visit:    Encounter for Depo-Provera contraception  -     tolerating well   -     received medroxyPROGESTERone (DEPO-PROVERA) injection 150 mg    Encounter for Papanicolaou smear of cervix  -     Liquid-Based Pap Smear, Screening    Screen for STD (sexually transmitted disease)  -     Chlamydia/GC, PCR: negative  -     Wet Prep, Genital: negative    Abnormal CXR (chest x-ray)  -     asymptomatic and low risk   -     repeat X-Ray Chest PA And Lateral  -     will obtain CT if CXR abnormal           Follow up in about 3 months (around 12/26/2024) for depo.    Future Appointments   Date Time Provider Department Center   10/9/2024  9:15 AM RESIDENTS, [facility] GYN [facility] GYN Иван Alcocer MD  [employer] , [facility]

## 2024-09-27 NOTE — PROGRESS NOTES
Date of encounter 09-26-24.  Contraception management; abnormal chest x-ray.  Tolerating Depo.  No resp. symptomatology.    Resident's note reviewed 09-27-24.  Agree with assessment; plan of care appropriate.  Professional services provided in an outpatient primary care center affiliated with a teaching institution.

## 2024-10-17 ENCOUNTER — HOSPITAL ENCOUNTER (OUTPATIENT)
Dept: RADIOLOGY | Facility: HOSPITAL | Age: 29
Discharge: HOME | End: 2024-10-17
Payer: COMMERCIAL

## 2024-10-17 DIAGNOSIS — R93.89 ABNORMAL CXR (CHEST X-RAY): ICD-10-CM

## 2024-10-17 PROCEDURE — 71046 X-RAY EXAM CHEST 2 VIEWS: CPT | Mod: TC

## 2024-11-25 ENCOUNTER — TELEPHONE (OUTPATIENT)
Dept: FAMILY MEDICINE | Facility: CLINIC | Age: 29
End: 2024-11-25
Payer: COMMERCIAL

## 2024-11-25 RX ORDER — ALBUTEROL SULFATE 0.83 MG/ML
2.5 SOLUTION RESPIRATORY (INHALATION) EVERY 6 HOURS PRN
Qty: 360 ML | Refills: 0 | Status: SHIPPED | OUTPATIENT
Start: 2024-11-25 | End: 2024-12-25

## 2024-11-25 NOTE — TELEPHONE ENCOUNTER
Patient requesting a refill on nebulizer solution,albuterol send to [facility], unable to propose.

## 2024-12-20 ENCOUNTER — OFFICE VISIT (OUTPATIENT)
Dept: FAMILY MEDICINE | Facility: CLINIC | Age: 29
End: 2024-12-20
Payer: COMMERCIAL

## 2024-12-20 VITALS
TEMPERATURE: 98 F | OXYGEN SATURATION: 100 % | RESPIRATION RATE: 20 BRPM | BODY MASS INDEX: 33.75 KG/M2 | SYSTOLIC BLOOD PRESSURE: 127 MMHG | HEART RATE: 101 BPM | WEIGHT: 210 LBS | DIASTOLIC BLOOD PRESSURE: 85 MMHG | HEIGHT: 66 IN

## 2024-12-20 DIAGNOSIS — Z30.42 ENCOUNTER FOR DEPO-PROVERA CONTRACEPTION: Primary | ICD-10-CM

## 2024-12-20 DIAGNOSIS — M79.10 MUSCLE SORENESS: ICD-10-CM

## 2024-12-20 LAB
B-HCG UR QL: NEGATIVE
CTP QC/QA: YES

## 2024-12-20 PROCEDURE — 99214 OFFICE O/P EST MOD 30 MIN: CPT | Mod: PBBFAC

## 2024-12-20 RX ORDER — MEDROXYPROGESTERONE ACETATE 150 MG/ML
150 INJECTION, SUSPENSION INTRAMUSCULAR
Status: COMPLETED | OUTPATIENT
Start: 2024-12-20 | End: 2024-12-20

## 2024-12-20 RX ORDER — ALBUTEROL SULFATE 0.83 MG/ML
2.5 SOLUTION RESPIRATORY (INHALATION) EVERY 6 HOURS PRN
Qty: 360 ML | Refills: 0 | Status: SHIPPED | OUTPATIENT
Start: 2024-12-20 | End: 2025-01-19

## 2024-12-20 RX ADMIN — MEDROXYPROGESTERONE ACETATE 150 MG: 150 INJECTION, SUSPENSION INTRAMUSCULAR at 03:12

## 2024-12-20 NOTE — PROGRESS NOTES
[facility] Progress Note    ID:  Heide Rodrigues   MRN:  [id number]     12/20/2024    Chief Complaint:    Chief Complaint   Patient presents with    Follow-up     History of Present Illness:  Heide Rodrigues is a 29 y.o. female who presents to [facility] clinic for:     Conditions addressed this visit:  Muscle soreness:  - onset several weeks  - location in upper and lower extremities, at joints   - laborious job, works at warehouse  - stopped working out several weeks ago    Contraception:  - Depo   - started 6/2024  - last received 09/26/2024  - Reports 3 weeks of light menses   - denies vaginal discharge, dysuria, abdominal pain     Problem List:  Environmental allergies: prn use of albuterol     Current Outpatient Medications   Medication Instructions    albuterol (PROVENTIL) 2.5 mg, Nebulization, Every 6 hours PRN, Rescue       Review of Systems:  Pertinent positives and negatives as mentioned in HPI    Objective:    Vitals:    12/20/24 1445   BP: 127/85   Pulse: 101   Resp: 20   Temp: 98.2 °F (36.8 °C)       Physical Exam  Vitals reviewed.   HENT:      Mouth/Throat:      Mouth: Mucous membranes are moist.   Eyes:      Extraocular Movements: Extraocular movements intact.   Cardiovascular:      Rate and Rhythm: Normal rate and regular rhythm.      Heart sounds: No murmur heard.  Pulmonary:      Effort: Pulmonary effort is normal. No respiratory distress.      Breath sounds: No stridor. No wheezing.   Musculoskeletal:      Right shoulder: No swelling or tenderness. Normal range of motion. Normal strength.      Left shoulder: No swelling or tenderness. Normal range of motion. Normal strength.      Right upper arm: No edema or deformity.      Left upper arm: No edema or deformity.      Right forearm: No deformity or tenderness.      Left forearm: No deformity or tenderness.      Right hip: Normal range of motion.      Left hip: Normal range of motion.      Right upper leg: No swelling or tenderness.      Left upper  leg: No swelling or tenderness.      Right knee: Normal range of motion. No tenderness.      Left knee: Normal range of motion. No tenderness.      Right lower leg: No edema.      Left lower leg: No edema.   Skin:     General: Skin is warm and dry.      Coloration: Skin is not pale.   Neurological:      Mental Status: She is alert and oriented to person, place, and time.   Psychiatric:         Behavior: Behavior normal.           Assessment/Plan:  Heide was seen today for follow-up and medication refill.    Diagnoses and all orders for this visit:    Encounter for Depo-Provera contraception  -     POCT urine pregnancy: negative  -     received medroxyPROGESTERone (DEPO-PROVERA) injection 150 mg in clinic today   -     suspect AUB secondary to depo, declines intervention at this time   -     consider provera at next visit vs stopping contraception     Muscle soreness  -     likely secondary to muscle loss from exercise cessation and laborious work  -     recommend to restart exercise program and stretching   -     educational handout provided   -     consider formal PT   -     can use OTC NSAIDs or tylenol for pain       Follow up in about 3 months (around 3/20/2025) for routine follow up.    Ro Alcocer MD  [employer] [facility], [facility]

## 2025-03-16 NOTE — PROGRESS NOTES
"[facility] Family Medicine Clinic Note    Subjective:     Patient ID: Heide Rodrigues is a 29 y.o. female    Chief Complaint:   Chief Complaint   Patient presents with    Follow-up     DE[P    Contraception     Patient here for Depo shot, no complaints.        HPI  Heide Rodrigues is a 29 y.o. female who presents for follow-up     Contraception:  - Depo started 6/2024  - LMP: unsure, says have none  - Last Sexual intercourse: 2 weeks ago, without protection  - last received 12/20/2024  - Denies vaginal discharge, dysuria, abdominal pain    Muscle Soreness  Possible deconditioning?  - symptoms resolved    Elevated BP  High BMI  - /84 in office today  - BP gradually creeping up, no HTN Dx  - Asymptomatic      Current Outpatient Medications   Medication Instructions    albuterol (PROVENTIL) 2.5 mg, Nebulization, Every 6 hours PRN, Rescue       Review of Systems  As per HPI    Objective:         9/26/2024     8:32 AM 12/20/2024     2:45 PM 3/18/2025     8:27 AM   Vitals - 1 value per visit   SYSTOLIC 114 127 129   DIASTOLIC 77 85 84   Pulse 84 101 90   Temp 97.9 °F (36.6 °C) 98.2 °F (36.8 °C) 97.8 °F (36.6 °C)   Resp 20 20 18   SPO2 100 % 100 % 100 %   Weight (lb) 199 210 208.2   Weight (kg) 90.266 95.255 94.439   Height 5' 6" (1.676 m) 5' 6" (1.676 m) 5' 6" (1.676 m)   BMI (Calculated) 32.1 33.9 33.6   Pain Score  Zero      Physical Exam  Gen: NAD, pleasant  CV: RRR, no murmurs. No LE edema.  Resp: CTAB, breathing non labored on room air.  Abd: Soft, non-distended, non-tender, bowel sounds normoactive.  MSK: ambulating spontaneously with appropriate ROM in all extremities.      Assessment & Plan     Assessment & Plan  1. Encounter for Depo-Provera contraception    2. Elevated BP without diagnosis of hypertension    3. BMI 33.0-33.9,adult      Plan:  -UPT negative  -Depo provera shot given  -Next due no sooner than June 3rd - No later Jun 17th, 2025.  -Discussed need for lifestyle modifications including heathy diet " and increased activity level in the setting up elevated BP. CTM.        Orders Placed This Encounter    POCT Urine Pregnancy    medroxyPROGESTERone (DEPO-PROVERA) injection 150 mg     Health Maintenance   Topic Date Due    Influenza Vaccine (1) 09/01/2024    COVID-19 Vaccine (5 - 2024-25 season) 09/01/2024    Pap Smear  09/26/2027    TETANUS VACCINE  05/03/2033    RSV Vaccine (Age 60+ and Pregnant patients) (1 - 1-dose 75+ series) 04/20/2070    Hepatitis C Screening  Completed    HIV Screening  Completed    Lipid Panel  Completed    Pneumococcal Vaccines (Age 0-49)  Aged Out     Future Appointments   Date Time Provider Department Center   6/5/2025  1:00 PM Ro Alcocer MD [facility]         RTC in 3 months (between June 3rd - June 17th)      Jacoby Mcgee MD, [facility] Family Medicine Resident, [facility]

## 2025-03-18 ENCOUNTER — OFFICE VISIT (OUTPATIENT)
Dept: FAMILY MEDICINE | Facility: CLINIC | Age: 30
End: 2025-03-18
Payer: COMMERCIAL

## 2025-03-18 VITALS
SYSTOLIC BLOOD PRESSURE: 129 MMHG | RESPIRATION RATE: 18 BRPM | OXYGEN SATURATION: 100 % | HEART RATE: 90 BPM | DIASTOLIC BLOOD PRESSURE: 84 MMHG | BODY MASS INDEX: 33.46 KG/M2 | HEIGHT: 66 IN | WEIGHT: 208.19 LBS | TEMPERATURE: 98 F

## 2025-03-18 DIAGNOSIS — Z30.42 ENCOUNTER FOR DEPO-PROVERA CONTRACEPTION: Primary | ICD-10-CM

## 2025-03-18 DIAGNOSIS — R03.0 ELEVATED BP WITHOUT DIAGNOSIS OF HYPERTENSION: ICD-10-CM

## 2025-03-18 LAB
B-HCG UR QL: NEGATIVE
CTP QC/QA: YES

## 2025-03-18 PROCEDURE — 99213 OFFICE O/P EST LOW 20 MIN: CPT | Mod: PBBFAC

## 2025-03-18 PROCEDURE — 96372 THER/PROPH/DIAG INJ SC/IM: CPT | Mod: PBBFAC

## 2025-03-18 RX ORDER — MEDROXYPROGESTERONE ACETATE 150 MG/ML
150 INJECTION, SUSPENSION INTRAMUSCULAR
Status: DISPENSED | OUTPATIENT
Start: 2025-03-18

## 2025-03-18 RX ADMIN — MEDROXYPROGESTERONE ACETATE 150 MG: 150 INJECTION, SUSPENSION INTRAMUSCULAR at 09:03

## 2025-06-10 ENCOUNTER — OFFICE VISIT (OUTPATIENT)
Dept: FAMILY MEDICINE | Facility: CLINIC | Age: 30
End: 2025-06-10
Payer: COMMERCIAL

## 2025-06-10 VITALS
TEMPERATURE: 99 F | OXYGEN SATURATION: 100 % | HEIGHT: 66 IN | RESPIRATION RATE: 18 BRPM | HEART RATE: 92 BPM | DIASTOLIC BLOOD PRESSURE: 84 MMHG | SYSTOLIC BLOOD PRESSURE: 122 MMHG | WEIGHT: 209 LBS | BODY MASS INDEX: 33.59 KG/M2

## 2025-06-10 DIAGNOSIS — F41.9 ANXIETY: ICD-10-CM

## 2025-06-10 DIAGNOSIS — Z30.42 ENCOUNTER FOR DEPO-PROVERA CONTRACEPTION: Primary | ICD-10-CM

## 2025-06-10 DIAGNOSIS — F43.20 ADJUSTMENT DISORDER, UNSPECIFIED TYPE: ICD-10-CM

## 2025-06-10 LAB
B-HCG UR QL: NEGATIVE
CTP QC/QA: YES

## 2025-06-10 PROCEDURE — 99214 OFFICE O/P EST MOD 30 MIN: CPT | Mod: PBBFAC

## 2025-06-10 PROCEDURE — 96372 THER/PROPH/DIAG INJ SC/IM: CPT | Mod: PBBFAC

## 2025-06-10 RX ORDER — MEDROXYPROGESTERONE ACETATE 150 MG/ML
150 INJECTION, SUSPENSION INTRAMUSCULAR
Status: COMPLETED | OUTPATIENT
Start: 2025-06-10 | End: 2025-06-10

## 2025-06-10 RX ORDER — HYDROXYZINE HYDROCHLORIDE 25 MG/1
25 TABLET, FILM COATED ORAL NIGHTLY PRN
Qty: 30 TABLET | Refills: 0 | Status: SHIPPED | OUTPATIENT
Start: 2025-06-10 | End: 2025-07-10

## 2025-06-10 RX ADMIN — MEDROXYPROGESTERONE ACETATE 150 MG: 150 INJECTION, SUSPENSION INTRAMUSCULAR at 03:06

## 2025-06-10 NOTE — PROGRESS NOTES
[facility] Progress Note    ID:  Heide Rodrigues   MRN:  [id number]     6/10/2025    Chief Complaint:    Chief Complaint   Patient presents with    Follow-up     Patient states no concerns.     History of Present Illness:  Heide Rodrigues is a 30 y.o. female who presents to [facility] FM clinic for:     Conditions addressed this visit:  Contraception:  - Depo   - started 6/2024  - last received 09/26/2024  - denies vaginal discharge or bleeding, dysuria, abdominal pain,     Anxiety:  - 1 month   - recently  from SO/partner    - is to start counseling soon   - difficulty sleeping     Problem List:  Allergic Rhinitis: prn use of albuterol     Current Outpatient Medications   Medication Instructions    albuterol (PROVENTIL) 2.5 mg, Nebulization, Every 6 hours PRN, Rescue       Review of Systems:  Pertinent positives and negatives as mentioned in HPI    Objective:    Vitals:    06/10/25 1447   BP: 122/84   Pulse: 92   Resp: 18   Temp: 98.7 °F (37.1 °C)       Physical Exam  Vitals reviewed.   Eyes:      Extraocular Movements: Extraocular movements intact.   Cardiovascular:      Rate and Rhythm: Normal rate and regular rhythm.      Heart sounds: No murmur heard.  Pulmonary:      Effort: Pulmonary effort is normal.      Breath sounds: No stridor. No wheezing.   Skin:     General: Skin is warm and dry.      Coloration: Skin is not pale.   Neurological:      Mental Status: She is alert and oriented to person, place, and time.   Psychiatric:         Behavior: Behavior normal.       Assessment/Plan:  Heide was seen today for follow-up and medication refill.    Diagnoses and all orders for this visit:    Encounter for Depo-Provera contraception  -     POCT urine pregnancy: negative  -     received medroxyPROGESTERone (DEPO-PROVERA) injection 150 mg in clinic today   -     suspect AUB secondary to depo, declines intervention at this time   -     consider provera at next visit vs stopping contraception      Anxiety  Adjustment disorder    -     suspect 2/2 to life changes    -     recommend compliance with counseling   -     start qhs prn hydroxyzine   -     obtain GAD7 at next visit   -     consider TSH, CMP, CBC if symptoms persist   -     educational handout provided     Follow up in about 3 months (around 9/10/2025) for brennen Alcocer MD  [employer] , [facility]